# Patient Record
Sex: FEMALE | Race: WHITE | NOT HISPANIC OR LATINO | Employment: OTHER | URBAN - METROPOLITAN AREA
[De-identification: names, ages, dates, MRNs, and addresses within clinical notes are randomized per-mention and may not be internally consistent; named-entity substitution may affect disease eponyms.]

---

## 2021-07-02 ENCOUNTER — RECORDS - HEALTHEAST (OUTPATIENT)
Dept: LAB | Facility: CLINIC | Age: 31
End: 2021-07-02

## 2021-07-02 LAB
CLUE CELLS: NORMAL
TRICHOMONAS, WET PREP: NORMAL
YEAST, WET PREP: NORMAL

## 2021-07-04 LAB — BACTERIA SPEC CULT: ABNORMAL

## 2022-01-05 ENCOUNTER — LAB REQUISITION (OUTPATIENT)
Dept: LAB | Facility: CLINIC | Age: 32
End: 2022-01-05
Payer: COMMERCIAL

## 2022-01-05 DIAGNOSIS — Z34.03 ENCOUNTER FOR SUPERVISION OF NORMAL FIRST PREGNANCY, THIRD TRIMESTER: ICD-10-CM

## 2022-01-05 DIAGNOSIS — Z3A.36 36 WEEKS GESTATION OF PREGNANCY: ICD-10-CM

## 2022-01-05 DIAGNOSIS — O14.90 UNSPECIFIED PRE-ECLAMPSIA, UNSPECIFIED TRIMESTER: ICD-10-CM

## 2022-01-05 LAB
ALBUMIN SERPL-MCNC: 2.9 G/DL (ref 3.5–5)
ALP SERPL-CCNC: 108 U/L (ref 45–120)
ALT SERPL W P-5'-P-CCNC: 19 U/L (ref 0–45)
AST SERPL W P-5'-P-CCNC: 22 U/L (ref 0–40)
BILIRUB DIRECT SERPL-MCNC: <0.1 MG/DL
BILIRUB SERPL-MCNC: 0.2 MG/DL (ref 0–1)
BUN SERPL-MCNC: 9 MG/DL (ref 8–22)
CREAT SERPL-MCNC: 0.64 MG/DL (ref 0.6–1.1)
ERYTHROCYTE [DISTWIDTH] IN BLOOD BY AUTOMATED COUNT: 12.8 % (ref 10–15)
GFR SERPL CREATININE-BSD FRML MDRD: >90 ML/MIN/1.73M2
GROUP B STREPTOCOCCUS (EXTERNAL): NEGATIVE
HCT VFR BLD AUTO: 38.8 % (ref 35–47)
HEPATITIS B SURFACE ANTIGEN (EXTERNAL): NONREACTIVE
HEPATITIS C ANTIBODY (EXTERNAL): NONREACTIVE
HGB BLD-MCNC: 13.1 G/DL (ref 11.7–15.7)
HIV1+2 AB SERPL QL IA: NONREACTIVE
LDH SERPL L TO P-CCNC: 334 U/L (ref 125–220)
MCH RBC QN AUTO: 31.7 PG (ref 26.5–33)
MCHC RBC AUTO-ENTMCNC: 33.8 G/DL (ref 31.5–36.5)
MCV RBC AUTO: 94 FL (ref 78–100)
PLATELET # BLD AUTO: 301 10E3/UL (ref 150–450)
PROT SERPL-MCNC: 6.9 G/DL (ref 6–8)
RBC # BLD AUTO: 4.13 10E6/UL (ref 3.8–5.2)
RUBELLA ANTIBODY IGG (EXTERNAL): NORMAL
URATE SERPL-MCNC: 4.9 MG/DL (ref 2–7.5)
WBC # BLD AUTO: 10.8 10E3/UL (ref 4–11)

## 2022-01-05 PROCEDURE — 80076 HEPATIC FUNCTION PANEL: CPT | Mod: ORL | Performed by: MIDWIFE

## 2022-01-05 PROCEDURE — 83615 LACTATE (LD) (LDH) ENZYME: CPT | Mod: ORL | Performed by: MIDWIFE

## 2022-01-05 PROCEDURE — 82565 ASSAY OF CREATININE: CPT | Mod: ORL | Performed by: MIDWIFE

## 2022-01-05 PROCEDURE — 84520 ASSAY OF UREA NITROGEN: CPT | Mod: ORL | Performed by: MIDWIFE

## 2022-01-05 PROCEDURE — 84550 ASSAY OF BLOOD/URIC ACID: CPT | Mod: ORL | Performed by: MIDWIFE

## 2022-01-05 PROCEDURE — 85027 COMPLETE CBC AUTOMATED: CPT | Mod: ORL | Performed by: MIDWIFE

## 2022-01-09 ENCOUNTER — HOSPITAL ENCOUNTER (INPATIENT)
Facility: CLINIC | Age: 32
LOS: 3 days | Discharge: HOME | End: 2022-01-12
Attending: ADVANCED PRACTICE MIDWIFE | Admitting: ADVANCED PRACTICE MIDWIFE
Payer: COMMERCIAL

## 2022-01-09 PROBLEM — O13.3 GESTATIONAL HYPERTENSION, THIRD TRIMESTER: Status: ACTIVE | Noted: 2022-01-09

## 2022-01-09 PROBLEM — Z34.90 ENCOUNTER FOR INDUCTION OF LABOR: Status: ACTIVE | Noted: 2022-01-09

## 2022-01-09 PROBLEM — M19.90 ARTHRITIS: Status: ACTIVE | Noted: 2017-05-24

## 2022-01-09 PROBLEM — E03.9 ACQUIRED HYPOTHYROIDISM: Status: ACTIVE | Noted: 2022-01-09

## 2022-01-09 PROBLEM — A69.20 LYME DISEASE: Status: ACTIVE | Noted: 2017-12-11

## 2022-01-09 PROBLEM — M79.7 FIBROMYALGIA: Status: ACTIVE | Noted: 2022-01-09

## 2022-01-09 LAB
ALBUMIN SERPL-MCNC: 2.6 G/DL (ref 3.4–5)
ALP SERPL-CCNC: 100 U/L (ref 40–150)
ALT SERPL W P-5'-P-CCNC: 24 U/L (ref 0–50)
ANION GAP SERPL CALCULATED.3IONS-SCNC: 8 MMOL/L (ref 3–14)
AST SERPL W P-5'-P-CCNC: 20 U/L (ref 0–45)
BILIRUB SERPL-MCNC: 0.3 MG/DL (ref 0.2–1.3)
BUN SERPL-MCNC: 10 MG/DL (ref 7–30)
CALCIUM SERPL-MCNC: 9.2 MG/DL (ref 8.5–10.1)
CHLORIDE BLD-SCNC: 109 MMOL/L (ref 94–109)
CO2 SERPL-SCNC: 19 MMOL/L (ref 20–32)
CREAT SERPL-MCNC: 0.56 MG/DL (ref 0.52–1.04)
CREAT UR-MCNC: 49 MG/DL
ERYTHROCYTE [DISTWIDTH] IN BLOOD BY AUTOMATED COUNT: 12.8 % (ref 10–15)
GFR SERPL CREATININE-BSD FRML MDRD: >90 ML/MIN/1.73M2
GLUCOSE BLD-MCNC: 79 MG/DL (ref 70–99)
HCT VFR BLD AUTO: 39.2 % (ref 35–47)
HGB BLD-MCNC: 13 G/DL (ref 11.7–15.7)
MCH RBC QN AUTO: 31.2 PG (ref 26.5–33)
MCHC RBC AUTO-ENTMCNC: 33.2 G/DL (ref 31.5–36.5)
MCV RBC AUTO: 94 FL (ref 78–100)
PLATELET # BLD AUTO: 263 10E3/UL (ref 150–450)
POTASSIUM BLD-SCNC: 3.7 MMOL/L (ref 3.4–5.3)
PROT SERPL-MCNC: 6.8 G/DL (ref 6.8–8.8)
PROT UR-MCNC: 0.07 G/L
PROT/CREAT 24H UR: 0.14 G/G CR (ref 0–0.2)
RBC # BLD AUTO: 4.17 10E6/UL (ref 3.8–5.2)
SODIUM SERPL-SCNC: 136 MMOL/L (ref 133–144)
T4 FREE SERPL-MCNC: 0.77 NG/DL (ref 0.76–1.46)
TSH SERPL DL<=0.005 MIU/L-ACNC: 4.25 MU/L (ref 0.4–4)
URATE SERPL-MCNC: 4.2 MG/DL (ref 2.6–6)
WBC # BLD AUTO: 12.6 10E3/UL (ref 4–11)

## 2022-01-09 PROCEDURE — 84550 ASSAY OF BLOOD/URIC ACID: CPT | Performed by: ADVANCED PRACTICE MIDWIFE

## 2022-01-09 PROCEDURE — 59025 FETAL NON-STRESS TEST: CPT

## 2022-01-09 PROCEDURE — 84443 ASSAY THYROID STIM HORMONE: CPT | Performed by: ADVANCED PRACTICE MIDWIFE

## 2022-01-09 PROCEDURE — 36415 COLL VENOUS BLD VENIPUNCTURE: CPT | Performed by: ADVANCED PRACTICE MIDWIFE

## 2022-01-09 PROCEDURE — G0463 HOSPITAL OUTPT CLINIC VISIT: HCPCS | Mod: 25

## 2022-01-09 PROCEDURE — 85027 COMPLETE CBC AUTOMATED: CPT | Performed by: ADVANCED PRACTICE MIDWIFE

## 2022-01-09 PROCEDURE — 120N000002 HC R&B MED SURG/OB UMMC

## 2022-01-09 PROCEDURE — 84439 ASSAY OF FREE THYROXINE: CPT | Performed by: ADVANCED PRACTICE MIDWIFE

## 2022-01-09 PROCEDURE — 80053 COMPREHEN METABOLIC PANEL: CPT | Performed by: ADVANCED PRACTICE MIDWIFE

## 2022-01-09 PROCEDURE — 84156 ASSAY OF PROTEIN URINE: CPT | Performed by: ADVANCED PRACTICE MIDWIFE

## 2022-01-09 RX ORDER — CARBOPROST TROMETHAMINE 250 UG/ML
250 INJECTION, SOLUTION INTRAMUSCULAR
Status: DISCONTINUED | OUTPATIENT
Start: 2022-01-09 | End: 2022-01-10

## 2022-01-09 RX ORDER — KETOROLAC TROMETHAMINE 30 MG/ML
30 INJECTION, SOLUTION INTRAMUSCULAR; INTRAVENOUS
Status: DISCONTINUED | OUTPATIENT
Start: 2022-01-09 | End: 2022-01-10

## 2022-01-09 RX ORDER — MISOPROSTOL 200 UG/1
800 TABLET ORAL
Status: DISCONTINUED | OUTPATIENT
Start: 2022-01-09 | End: 2022-01-10

## 2022-01-09 RX ORDER — MISOPROSTOL 200 UG/1
400 TABLET ORAL
Status: DISCONTINUED | OUTPATIENT
Start: 2022-01-09 | End: 2022-01-10

## 2022-01-09 RX ORDER — OXYTOCIN/0.9 % SODIUM CHLORIDE 30/500 ML
100-340 PLASTIC BAG, INJECTION (ML) INTRAVENOUS CONTINUOUS PRN
Status: DISCONTINUED | OUTPATIENT
Start: 2022-01-09 | End: 2022-01-10

## 2022-01-09 RX ORDER — HYDROXYZINE HYDROCHLORIDE 50 MG/1
100 TABLET, FILM COATED ORAL
Status: DISCONTINUED | OUTPATIENT
Start: 2022-01-09 | End: 2022-01-10

## 2022-01-09 RX ORDER — METOCLOPRAMIDE HYDROCHLORIDE 5 MG/ML
10 INJECTION INTRAMUSCULAR; INTRAVENOUS EVERY 6 HOURS PRN
Status: DISCONTINUED | OUTPATIENT
Start: 2022-01-09 | End: 2022-01-10

## 2022-01-09 RX ORDER — NALOXONE HYDROCHLORIDE 0.4 MG/ML
0.4 INJECTION, SOLUTION INTRAMUSCULAR; INTRAVENOUS; SUBCUTANEOUS
Status: DISCONTINUED | OUTPATIENT
Start: 2022-01-09 | End: 2022-01-10

## 2022-01-09 RX ORDER — MORPHINE SULFATE 10 MG/ML
10 INJECTION, SOLUTION INTRAMUSCULAR; INTRAVENOUS
Status: COMPLETED | OUTPATIENT
Start: 2022-01-09 | End: 2022-01-10

## 2022-01-09 RX ORDER — PROCHLORPERAZINE 25 MG
25 SUPPOSITORY, RECTAL RECTAL EVERY 12 HOURS PRN
Status: DISCONTINUED | OUTPATIENT
Start: 2022-01-09 | End: 2022-01-09 | Stop reason: HOSPADM

## 2022-01-09 RX ORDER — OXYTOCIN/0.9 % SODIUM CHLORIDE 30/500 ML
340 PLASTIC BAG, INJECTION (ML) INTRAVENOUS CONTINUOUS PRN
Status: DISCONTINUED | OUTPATIENT
Start: 2022-01-09 | End: 2022-01-10

## 2022-01-09 RX ORDER — ONDANSETRON 4 MG/1
4 TABLET, ORALLY DISINTEGRATING ORAL EVERY 6 HOURS PRN
Status: DISCONTINUED | OUTPATIENT
Start: 2022-01-09 | End: 2022-01-09 | Stop reason: HOSPADM

## 2022-01-09 RX ORDER — METOCLOPRAMIDE 10 MG/1
10 TABLET ORAL EVERY 6 HOURS PRN
Status: DISCONTINUED | OUTPATIENT
Start: 2022-01-09 | End: 2022-01-09 | Stop reason: HOSPADM

## 2022-01-09 RX ORDER — OXYTOCIN 10 [USP'U]/ML
10 INJECTION, SOLUTION INTRAMUSCULAR; INTRAVENOUS
Status: DISCONTINUED | OUTPATIENT
Start: 2022-01-09 | End: 2022-01-10

## 2022-01-09 RX ORDER — PROCHLORPERAZINE 25 MG
25 SUPPOSITORY, RECTAL RECTAL EVERY 12 HOURS PRN
Status: DISCONTINUED | OUTPATIENT
Start: 2022-01-09 | End: 2022-01-10

## 2022-01-09 RX ORDER — ONDANSETRON 4 MG/1
4 TABLET, ORALLY DISINTEGRATING ORAL EVERY 6 HOURS PRN
Status: DISCONTINUED | OUTPATIENT
Start: 2022-01-09 | End: 2022-01-10

## 2022-01-09 RX ORDER — NALOXONE HYDROCHLORIDE 0.4 MG/ML
0.2 INJECTION, SOLUTION INTRAMUSCULAR; INTRAVENOUS; SUBCUTANEOUS
Status: DISCONTINUED | OUTPATIENT
Start: 2022-01-09 | End: 2022-01-10

## 2022-01-09 RX ORDER — IBUPROFEN 600 MG/1
600 TABLET, FILM COATED ORAL
Status: DISCONTINUED | OUTPATIENT
Start: 2022-01-09 | End: 2022-01-10

## 2022-01-09 RX ORDER — ONDANSETRON 2 MG/ML
4 INJECTION INTRAMUSCULAR; INTRAVENOUS EVERY 6 HOURS PRN
Status: DISCONTINUED | OUTPATIENT
Start: 2022-01-09 | End: 2022-01-09 | Stop reason: HOSPADM

## 2022-01-09 RX ORDER — METHYLERGONOVINE MALEATE 0.2 MG/ML
200 INJECTION INTRAVENOUS
Status: DISCONTINUED | OUTPATIENT
Start: 2022-01-09 | End: 2022-01-10

## 2022-01-09 RX ORDER — FENTANYL CITRATE 50 UG/ML
50-100 INJECTION, SOLUTION INTRAMUSCULAR; INTRAVENOUS
Status: DISCONTINUED | OUTPATIENT
Start: 2022-01-09 | End: 2022-01-10

## 2022-01-09 RX ORDER — PROCHLORPERAZINE MALEATE 10 MG
10 TABLET ORAL EVERY 6 HOURS PRN
Status: DISCONTINUED | OUTPATIENT
Start: 2022-01-09 | End: 2022-01-10

## 2022-01-09 RX ORDER — ACETAMINOPHEN 325 MG/1
650 TABLET ORAL EVERY 4 HOURS PRN
Status: DISCONTINUED | OUTPATIENT
Start: 2022-01-09 | End: 2022-01-10

## 2022-01-09 RX ORDER — MISOPROSTOL 100 UG/1
25 TABLET ORAL EVERY 4 HOURS PRN
Status: DISCONTINUED | OUTPATIENT
Start: 2022-01-09 | End: 2022-01-10

## 2022-01-09 RX ORDER — TRANEXAMIC ACID 10 MG/ML
1 INJECTION, SOLUTION INTRAVENOUS EVERY 30 MIN PRN
Status: DISCONTINUED | OUTPATIENT
Start: 2022-01-09 | End: 2022-01-10

## 2022-01-09 RX ORDER — METOCLOPRAMIDE 10 MG/1
10 TABLET ORAL EVERY 6 HOURS PRN
Status: DISCONTINUED | OUTPATIENT
Start: 2022-01-09 | End: 2022-01-10

## 2022-01-09 RX ORDER — PROCHLORPERAZINE MALEATE 10 MG
10 TABLET ORAL EVERY 6 HOURS PRN
Status: DISCONTINUED | OUTPATIENT
Start: 2022-01-09 | End: 2022-01-09 | Stop reason: HOSPADM

## 2022-01-09 RX ORDER — ONDANSETRON 2 MG/ML
4 INJECTION INTRAMUSCULAR; INTRAVENOUS EVERY 6 HOURS PRN
Status: DISCONTINUED | OUTPATIENT
Start: 2022-01-09 | End: 2022-01-10

## 2022-01-09 RX ORDER — METOCLOPRAMIDE HYDROCHLORIDE 5 MG/ML
10 INJECTION INTRAMUSCULAR; INTRAVENOUS EVERY 6 HOURS PRN
Status: DISCONTINUED | OUTPATIENT
Start: 2022-01-09 | End: 2022-01-09 | Stop reason: HOSPADM

## 2022-01-10 PROBLEM — O09.899 RUBELLA NON-IMMUNE STATUS, ANTEPARTUM: Status: ACTIVE | Noted: 2022-01-10

## 2022-01-10 PROBLEM — Z28.39 RUBELLA NON-IMMUNE STATUS, ANTEPARTUM: Status: ACTIVE | Noted: 2022-01-10

## 2022-01-10 LAB
ABO/RH(D): NORMAL
ANTIBODY SCREEN: NEGATIVE
HOLD SPECIMEN: NORMAL
RPR SER QL: NONREACTIVE
SARS-COV-2 RNA RESP QL NAA+PROBE: NEGATIVE
SPECIMEN EXPIRATION DATE: NORMAL
T PALLIDUM AB SER QL: REACTIVE

## 2022-01-10 PROCEDURE — 250N000009 HC RX 250: Performed by: REGISTERED NURSE

## 2022-01-10 PROCEDURE — U0005 INFEC AGEN DETEC AMPLI PROBE: HCPCS | Performed by: ADVANCED PRACTICE MIDWIFE

## 2022-01-10 PROCEDURE — 59409 OBSTETRICAL CARE: CPT | Performed by: ADVANCED PRACTICE MIDWIFE

## 2022-01-10 PROCEDURE — 10907ZC DRAINAGE OF AMNIOTIC FLUID, THERAPEUTIC FROM PRODUCTS OF CONCEPTION, VIA NATURAL OR ARTIFICIAL OPENING: ICD-10-PCS | Performed by: REGISTERED NURSE

## 2022-01-10 PROCEDURE — 86592 SYPHILIS TEST NON-TREP QUAL: CPT | Performed by: ADVANCED PRACTICE MIDWIFE

## 2022-01-10 PROCEDURE — 258N000003 HC RX IP 258 OP 636: Performed by: REGISTERED NURSE

## 2022-01-10 PROCEDURE — 36415 COLL VENOUS BLD VENIPUNCTURE: CPT | Performed by: ADVANCED PRACTICE MIDWIFE

## 2022-01-10 PROCEDURE — 3E0P7VZ INTRODUCTION OF HORMONE INTO FEMALE REPRODUCTIVE, VIA NATURAL OR ARTIFICIAL OPENING: ICD-10-PCS | Performed by: REGISTERED NURSE

## 2022-01-10 PROCEDURE — 722N000001 HC LABOR CARE VAGINAL DELIVERY SINGLE

## 2022-01-10 PROCEDURE — 250N000013 HC RX MED GY IP 250 OP 250 PS 637: Performed by: ADVANCED PRACTICE MIDWIFE

## 2022-01-10 PROCEDURE — 86850 RBC ANTIBODY SCREEN: CPT | Performed by: ADVANCED PRACTICE MIDWIFE

## 2022-01-10 PROCEDURE — 250N000009 HC RX 250

## 2022-01-10 PROCEDURE — 250N000011 HC RX IP 250 OP 636: Performed by: ADVANCED PRACTICE MIDWIFE

## 2022-01-10 PROCEDURE — 0KQM0ZZ REPAIR PERINEUM MUSCLE, OPEN APPROACH: ICD-10-PCS | Performed by: ADVANCED PRACTICE MIDWIFE

## 2022-01-10 PROCEDURE — 120N000002 HC R&B MED SURG/OB UMMC

## 2022-01-10 PROCEDURE — 86780 TREPONEMA PALLIDUM: CPT | Performed by: ADVANCED PRACTICE MIDWIFE

## 2022-01-10 PROCEDURE — 86901 BLOOD TYPING SEROLOGIC RH(D): CPT | Performed by: ADVANCED PRACTICE MIDWIFE

## 2022-01-10 RX ORDER — OXYTOCIN 10 [USP'U]/ML
INJECTION, SOLUTION INTRAMUSCULAR; INTRAVENOUS
Status: DISCONTINUED
Start: 2022-01-10 | End: 2022-01-10 | Stop reason: HOSPADM

## 2022-01-10 RX ORDER — MISOPROSTOL 200 UG/1
TABLET ORAL
Status: DISCONTINUED
Start: 2022-01-10 | End: 2022-01-10 | Stop reason: HOSPADM

## 2022-01-10 RX ORDER — DOCUSATE SODIUM 100 MG/1
100 CAPSULE, LIQUID FILLED ORAL DAILY
Qty: 20 CAPSULE | Refills: 0 | Status: SHIPPED | OUTPATIENT
Start: 2022-01-11 | End: 2022-01-11

## 2022-01-10 RX ORDER — HYDROCORTISONE 2.5 %
CREAM (GRAM) TOPICAL 3 TIMES DAILY PRN
Status: DISCONTINUED | OUTPATIENT
Start: 2022-01-10 | End: 2022-01-12 | Stop reason: HOSPADM

## 2022-01-10 RX ORDER — MISOPROSTOL 200 UG/1
800 TABLET ORAL
Status: DISCONTINUED | OUTPATIENT
Start: 2022-01-10 | End: 2022-01-12 | Stop reason: HOSPADM

## 2022-01-10 RX ORDER — LIDOCAINE HYDROCHLORIDE 10 MG/ML
INJECTION, SOLUTION EPIDURAL; INFILTRATION; INTRACAUDAL; PERINEURAL
Status: COMPLETED
Start: 2022-01-10 | End: 2022-01-10

## 2022-01-10 RX ORDER — METHYLERGONOVINE MALEATE 0.2 MG/ML
200 INJECTION INTRAVENOUS
Status: DISCONTINUED | OUTPATIENT
Start: 2022-01-10 | End: 2022-01-12 | Stop reason: HOSPADM

## 2022-01-10 RX ORDER — IBUPROFEN 800 MG/1
800 TABLET, FILM COATED ORAL EVERY 6 HOURS PRN
Status: DISCONTINUED | OUTPATIENT
Start: 2022-01-10 | End: 2022-01-12 | Stop reason: HOSPADM

## 2022-01-10 RX ORDER — BISACODYL 10 MG
10 SUPPOSITORY, RECTAL RECTAL DAILY PRN
Status: DISCONTINUED | OUTPATIENT
Start: 2022-01-10 | End: 2022-01-12 | Stop reason: HOSPADM

## 2022-01-10 RX ORDER — ACETAMINOPHEN 325 MG/1
650 TABLET ORAL EVERY 4 HOURS PRN
Status: DISCONTINUED | OUTPATIENT
Start: 2022-01-10 | End: 2022-01-12 | Stop reason: HOSPADM

## 2022-01-10 RX ORDER — MISOPROSTOL 200 UG/1
400 TABLET ORAL
Status: DISCONTINUED | OUTPATIENT
Start: 2022-01-10 | End: 2022-01-12 | Stop reason: HOSPADM

## 2022-01-10 RX ORDER — MODIFIED LANOLIN
OINTMENT (GRAM) TOPICAL
Status: DISCONTINUED | OUTPATIENT
Start: 2022-01-10 | End: 2022-01-12 | Stop reason: HOSPADM

## 2022-01-10 RX ORDER — SODIUM CHLORIDE, SODIUM LACTATE, POTASSIUM CHLORIDE, CALCIUM CHLORIDE 600; 310; 30; 20 MG/100ML; MG/100ML; MG/100ML; MG/100ML
INJECTION, SOLUTION INTRAVENOUS CONTINUOUS PRN
Status: DISCONTINUED | OUTPATIENT
Start: 2022-01-10 | End: 2022-01-10

## 2022-01-10 RX ORDER — CARBOPROST TROMETHAMINE 250 UG/ML
250 INJECTION, SOLUTION INTRAMUSCULAR
Status: DISCONTINUED | OUTPATIENT
Start: 2022-01-10 | End: 2022-01-12 | Stop reason: HOSPADM

## 2022-01-10 RX ORDER — OXYTOCIN/0.9 % SODIUM CHLORIDE 30/500 ML
340 PLASTIC BAG, INJECTION (ML) INTRAVENOUS CONTINUOUS PRN
Status: DISCONTINUED | OUTPATIENT
Start: 2022-01-10 | End: 2022-01-12 | Stop reason: HOSPADM

## 2022-01-10 RX ORDER — OXYTOCIN/0.9 % SODIUM CHLORIDE 30/500 ML
1-24 PLASTIC BAG, INJECTION (ML) INTRAVENOUS CONTINUOUS
Status: DISCONTINUED | OUTPATIENT
Start: 2022-01-10 | End: 2022-01-10

## 2022-01-10 RX ORDER — LIDOCAINE 40 MG/G
CREAM TOPICAL
Status: DISCONTINUED | OUTPATIENT
Start: 2022-01-10 | End: 2022-01-10

## 2022-01-10 RX ORDER — OXYTOCIN/0.9 % SODIUM CHLORIDE 30/500 ML
PLASTIC BAG, INJECTION (ML) INTRAVENOUS
Status: DISCONTINUED
Start: 2022-01-10 | End: 2022-01-10 | Stop reason: HOSPADM

## 2022-01-10 RX ORDER — OXYTOCIN 10 [USP'U]/ML
10 INJECTION, SOLUTION INTRAMUSCULAR; INTRAVENOUS
Status: DISCONTINUED | OUTPATIENT
Start: 2022-01-10 | End: 2022-01-12 | Stop reason: HOSPADM

## 2022-01-10 RX ORDER — TRANEXAMIC ACID 10 MG/ML
1 INJECTION, SOLUTION INTRAVENOUS EVERY 30 MIN PRN
Status: DISCONTINUED | OUTPATIENT
Start: 2022-01-10 | End: 2022-01-12 | Stop reason: HOSPADM

## 2022-01-10 RX ORDER — DOCUSATE SODIUM 100 MG/1
100 CAPSULE, LIQUID FILLED ORAL DAILY
Status: DISCONTINUED | OUTPATIENT
Start: 2022-01-11 | End: 2022-01-12 | Stop reason: HOSPADM

## 2022-01-10 RX ADMIN — FENTANYL CITRATE 100 MCG: 50 INJECTION INTRAMUSCULAR; INTRAVENOUS at 19:17

## 2022-01-10 RX ADMIN — MORPHINE SULFATE 10 MG: 10 INJECTION, SOLUTION INTRAMUSCULAR; INTRAVENOUS at 02:26

## 2022-01-10 RX ADMIN — ACETAMINOPHEN 650 MG: 325 TABLET, FILM COATED ORAL at 15:49

## 2022-01-10 RX ADMIN — IBUPROFEN 800 MG: 800 TABLET, FILM COATED ORAL at 22:00

## 2022-01-10 RX ADMIN — SODIUM CHLORIDE, POTASSIUM CHLORIDE, SODIUM LACTATE AND CALCIUM CHLORIDE: 600; 310; 30; 20 INJECTION, SOLUTION INTRAVENOUS at 14:15

## 2022-01-10 RX ADMIN — Medication 2 MILLI-UNITS/MIN: at 09:06

## 2022-01-10 RX ADMIN — SODIUM CHLORIDE, POTASSIUM CHLORIDE, SODIUM LACTATE AND CALCIUM CHLORIDE: 600; 310; 30; 20 INJECTION, SOLUTION INTRAVENOUS at 09:05

## 2022-01-10 RX ADMIN — MISOPROSTOL 25 MCG: 100 TABLET ORAL at 00:10

## 2022-01-10 RX ADMIN — LIDOCAINE HYDROCHLORIDE 10 ML: 10 INJECTION, SOLUTION EPIDURAL; INFILTRATION; INTRACAUDAL; PERINEURAL at 19:17

## 2022-01-10 RX ADMIN — HYDROXYZINE HYDROCHLORIDE 100 MG: 50 TABLET, FILM COATED ORAL at 01:25

## 2022-01-10 RX ADMIN — MISOPROSTOL 25 MCG: 100 TABLET ORAL at 04:23

## 2022-01-10 NOTE — PROGRESS NOTES
Blood pressure 136/81, pulse 94, temperature 98.1  F (36.7  C), temperature source Oral, resp. rate 16, last menstrual period 04/25/2021, not currently breastfeeding.  Patient Vitals for the past 24 hrs:   BP Temp Temp src Pulse Resp   01/10/22 0730 136/81 98.1  F (36.7  C) Oral -- 16   01/10/22 0631 125/73 97.9  F (36.6  C) Oral -- 14   01/10/22 0423 123/76 98  F (36.7  C) Oral -- 14   01/10/22 0100 (!) 133/90 98.2  F (36.8  C) Oral -- 14   01/10/22 0030 -- 97.8  F (36.6  C) Oral -- 20   01/09/22 2335 (!) 144/98 -- -- -- --   01/09/22 2316 (!) 175/105 -- -- -- --   01/09/22 2302 (!) 152/97 -- -- -- --   01/09/22 2130 (!) 142/92 -- -- -- --   01/09/22 2111 (!) 157/98 97.9  F (36.6  C) Oral 94 17     General appearance: javier Abarca was a KARY late last night from [facility] for new diagnosis of GHTN. HEELP labs WNL on admission. Had one severe range BP since arrival and BP has been WNL overnight. Reports having a mild HA this morning but denies vision changes, and RUQ/epigastric pain.     Flores balloon placed at midnight and x2 doses of vaginal cytotec given overnight (last at 0423). Their flores bulb fell out while up to the bathroom this morning at 0715. They are feeling groggy from morphine and vistaril given overnight. Agreeable to cervical exam this morning. States they prefer to avoid epidural during labor but are open to it if needed. Aware of option for IV fentanyl and N2O2 for pain control.     Partner Kai and gogo Jang at bedside for support.     CONTACTIONS: irreg  Pitocin- none,  Antibiotics- none  FETAL HEART TONES: continuous EFM- baseline 135 with moderate variability, having intermittent periods of minimal variability, and positive accelerations. No decelerations.  ROM: not ruptured  PELVIC EXAM:4/ 70%/ Posterior/ average/ -2 , KELLY=6    THYROID: no nodules or goiter palpated     # Pain Assessment:  Current Pain Score 1/10/2022   Patient currently in pain? yes   -  Rima is experiencing pain due to contractions. Pain management was discussed with Tsering and her partner Kai and gogo Jang and the plan was created in a collaborative fashion.  Tsering's response to the current recommendations: engaged  - Non-pharmacologic adjuvants: aromtherapy, movement, hydrotherapy  - Open to IV fentanyl, N2O2 and epidural however would like to avoid these if possible - wants to be flexible during birth.       Results for orders placed or performed during the hospital encounter of 01/09/22   CBC with platelets     Status: Abnormal   Result Value Ref Range    WBC Count 12.6 (H) 4.0 - 11.0 10e3/uL    RBC Count 4.17 3.80 - 5.20 10e6/uL    Hemoglobin 13.0 11.7 - 15.7 g/dL    Hematocrit 39.2 35.0 - 47.0 %    MCV 94 78 - 100 fL    MCH 31.2 26.5 - 33.0 pg    MCHC 33.2 31.5 - 36.5 g/dL    RDW 12.8 10.0 - 15.0 %    Platelet Count 263 150 - 450 10e3/uL   Comprehensive metabolic panel     Status: Abnormal   Result Value Ref Range    Sodium 136 133 - 144 mmol/L    Potassium 3.7 3.4 - 5.3 mmol/L    Chloride 109 94 - 109 mmol/L    Carbon Dioxide (CO2) 19 (L) 20 - 32 mmol/L    Anion Gap 8 3 - 14 mmol/L    Urea Nitrogen 10 7 - 30 mg/dL    Creatinine 0.56 0.52 - 1.04 mg/dL    Calcium 9.2 8.5 - 10.1 mg/dL    Glucose 79 70 - 99 mg/dL    Alkaline Phosphatase 100 40 - 150 U/L    AST 20 0 - 45 U/L    ALT 24 0 - 50 U/L    Protein Total 6.8 6.8 - 8.8 g/dL    Albumin 2.6 (L) 3.4 - 5.0 g/dL    Bilirubin Total 0.3 0.2 - 1.3 mg/dL    GFR Estimate >90 >60 mL/min/1.73m2   Uric acid     Status: Normal   Result Value Ref Range    Uric Acid 4.2 2.6 - 6.0 mg/dL   Protein  random urine with Creat Ratio     Status: None   Result Value Ref Range    Total Protein Random Urine g/L 0.07 g/L    Total Protein Urine g/gr Creatinine 0.14 0.00 - 0.20 g/g Cr    Creatinine Urine mg/dL 49 mg/dL   TSH with free T4 reflex     Status: Abnormal   Result Value Ref Range    TSH 4.25 (H) 0.40 - 4.00 mU/L   T4 free     Status: Normal    Result Value Ref Range    Free T4 0.77 0.76 - 1.46 ng/dL   Asymptomatic COVID-19 Virus (Coronavirus) by PCR Nasopharyngeal     Status: Normal    Specimen: Nasopharyngeal; Swab   Result Value Ref Range    SARS CoV2 PCR Negative Negative    Narrative    Testing was performed using the travis  SARS-CoV-2 & Influenza A/B Assay on the travis  Freya  System.  This test should be ordered for the detection of SARS-COV-2 in individuals who meet SARS-CoV-2 clinical and/or epidemiological criteria. Test performance is unknown in asymptomatic patients.  This test is for in vitro diagnostic use under the FDA EUA for laboratories certified under CLIA to perform moderate and/or high complexity testing. This test has not been FDA cleared or approved.  A negative test does not rule out the presence of PCR inhibitors in the specimen or target RNA in concentration below the limit of detection for the assay. The possibility of a false negative should be considered if the patient's recent exposure or clinical presentation suggests COVID-19.  [facility] are certified under the Clinical Laboratory Improvement Amendments of 1988 (CLIA-88) as qualified to perform moderate and/or high complexity laboratory testing.   Extra Purple Top Tube     Status: None   Result Value Ref Range    Hold Specimen [facility]    Adult Type and Screen     Status: None   Result Value Ref Range    ABO/RH(D) A POS     Antibody Screen Negative Negative    SPECIMEN EXPIRATION DATE [id number]    Hepatitis B Surface Antigen (External Result)     Status: None   Result Value Ref Range    Hepatitis B Surface Antigen (External) Nonreactive Nonreactive   Rubella Antibody IgG (External Result)     Status: None   Result Value Ref Range    Rubella Antibody IgG (External) Non-Immune Nonreactive   HIV-1 Antibody (External Result)     Status: None   Result Value Ref Range    HIV 1&2 Antibody (External) Nonreactive Nonreactive   Group B Streptococcus (External  Result)     Status: None   Result Value Ref Range    Group B Streptococcus (External) Negative Negative   Hepatitis C Antibody (External Result)     Status: None   Result Value Ref Range    Hepatitis C Antibody (External) Nonreactive Nonreactive   ABO/Rh type and screen     Status: None    Narrative    The following orders were created for panel order ABO/Rh type and screen.  Procedure                               Abnormality         Status                     ---------                               -----------         ------                     Adult Type and Screen[[id number]]                            Final result                 Please view results for these tests on the individual orders.   Extra Tube     Status: None    Narrative    The following orders were created for panel order Extra Tube.  Procedure                               Abnormality         Status                     ---------                               -----------         ------                     Extra Purple Top Tube[[id number]]                            Final result                 Please view results for these tests on the individual orders.       ASSESSMENT:  ==============  IUP @ 37w1d for induction of labor.  Indication: gestational hypertension   COVID Negative  Fetal Heart Rate Tracing category one  GBS - negative  Hx hypothyroidism  Fibromyalgia  MMR Non-immune  KARY from [facility]  Hx lyme's disease    Patient Active Problem List   Diagnosis     Gestational hypertension, third trimester     Lyme disease     Arthritis     Fibromyalgia     Acquired hypothyroidism     Encounter for induction of labor     Rubella non-immune status, antepartum     PLAN:  ===========  - Pt is planning to eat breakfast then will begin pitocin. Discussed method of induction at length with patient and Kai. All questions answered, pt is agreeable to plan.   - Desires to move during labor, discussed need for continuous fetal monitoring  "and will plan on wireless monitors to aid with movement . Ambulation, hydration, position changes, birthing ball/sling and tub options to facilitate labor.  - Pain medication options of Nitrous Oxide, Fentanyl IV and epidural reviewed with pt. Pt is interested in all options but would like to avoid if possible.   - Has hx of hypothyroidism, was taking \"amour thyroid\" pre-pregnancy then discontinued at start of pregnancy. Labs during pregnancy drawn at [facility] 6/30/21 TSH - 1.68, on admission TSH 4.25 - plan to follow-up postpartum with endocrine. Undecided if [facility] will follow or if they prefer to follow-up with us.     CHOLO Duarte CNM  "

## 2022-01-10 NOTE — PROGRESS NOTES
Blood pressure 114/81, pulse 94, temperature 98.4  F (36.9  C), temperature source Oral, resp. rate 16, last menstrual period 04/25/2021, not currently breastfeeding.  Patient Vitals for the past 24 hrs:   BP Temp Temp src Pulse Resp   01/10/22 1430 114/81 98.4  F (36.9  C) Oral -- 16   01/10/22 1130 (!) 145/82 98.5  F (36.9  C) Oral -- 16   01/10/22 0906 129/76 98  F (36.7  C) Oral -- 16   01/10/22 0730 136/81 98.1  F (36.7  C) Oral -- 16   01/10/22 0631 125/73 97.9  F (36.6  C) Oral -- 14   01/10/22 0423 123/76 98  F (36.7  C) Oral -- 14   01/10/22 0100 (!) 133/90 98.2  F (36.8  C) Oral -- 14   01/10/22 0030 -- 97.8  F (36.6  C) Oral -- 20 01/09/22 2335 (!) 144/98 -- -- -- --   01/09/22 2316 (!) 175/105 -- -- -- --   01/09/22 2302 (!) 152/97 -- -- -- --   01/09/22 2130 (!) 142/92 -- -- -- --   01/09/22 2111 (!) 157/98 97.9  F (36.6  C) Oral 94 17     General appearance: uncomfortable with contractions    YogeshTsering is feeling contractions are stronger but still only feeling them irregularly, some are back to back and others are spaced out  7-10 minutes. Is up and moving around the room with . Reports feeling tired and has a HA, would like tylenol. Denies RUQ pain or any visual changes. Continues to be difficult to trace contraction pattern with toco monitor. Pt is agreeable to cervical exam and open to having AROM.     CONTACTIONS: irreg and difficult to farshad, RN adjusting toco frequently. Pt reports feeling ctx Q1-7 minutes  Pitocin- 12 mu/min.,  Antibiotics- none  FETAL HEART TONES: continuous EFM- baseline 135 with moderate variability and positive accelerations. No decelerations.  ROM: not ruptured  PELVIC EXAM:4/ 60%/ Posterior/ average/ -2 . Unchanged from prior exam    ASSESSMENT:  ==============  IUP @ 37w1d for induction of labor.  Indication: gestational hypertension   Hx hypothyroidism  Fibromyalgia  MMR Non-immune  KARY from [facility]  Hx lyme's disease  Fetal Heart Rate  Tracing category one  GBS- negative    Patient Active Problem List   Diagnosis     Gestational hypertension, third trimester     Lyme disease     Arthritis     Fibromyalgia     Acquired hypothyroidism     Encounter for induction of labor     Rubella non-immune status, antepartum     PLAN:  ===========  Discussion with pt and Kai about AROM. Reviewed risks and benefits of procedure including risk of fetal intolerance and cord prolapse. Reviewed cord prolapse is unlikely when AROM is done in controlled setting.   Pt was agreeable to AROM however prior to performing they became very tearful and exam was paused. Pt would like to wait for AROM and see if contractions will become stronger on their own in the next hour then is open to revisiting this as option.   Give tylenol for HA, encourage position changes out of bed.   Close monitoring of BP, titrate pitocin per protocol.   Will reassess in 1 hour or sooner prn.     CHOLO Duarte CNM

## 2022-01-10 NOTE — PROVIDER NOTIFICATION
"   01/10/22 0715   Provider Notification   Provider Name/Title Henrietta PAULINO   Method of Notification Electronic Page   Request Evaluate - Remote   Notification Reason Other (Comment)  (FB out)   Paged ARISTIDESM, \"FYI: FB came out when patient sat on toliet.\"  "

## 2022-01-10 NOTE — PROGRESS NOTES
Labor Progress Note:    Date:  1/10/2022  Time:  6:17 AM    Patient Name:  Tsering Montejo  :      1990  MRN:      [id number]    Assessment:    30 yo , at 37w, IOL for gHTN  Now normotensive, HELLP labs NL  Not in labor  Fetal Heart Rate Tracing category one  GBS- negative  Negative SARS-CoV-2 PCR test  Rubella Non-Immune  Hypothyroidism, not on meds        Plan:   -Continue with PV Miso until adequate cervical change for pitocin  -Continue with Malik traction until it falls out.   -Routine CNM support & management. Encourage position changes, ambulation, rest as desired.  -Ambulation, hydration, position changes, birthing ball/sling and tub options to facilitate labor.   -Anticipate progress and NSVB.   -Plan MMR postpartum  -Reevaluate progress in 2-3 hours or sooner with a change in status.    Subjective:  Rima is sleeping comfortably. Received Vistaril at 0125 and Morphine at 0226, Kai is sleeping at their side.   Voiding without issue.     Objective:  /76   Pulse 94   Temp 98  F (36.7  C) (Oral)   Resp 14   LMP 2021 (Approximate)   Breastfeeding No       General appearance: comfortable  CONTRACTIONS: irreg and mild  Pitocin- none,  Antibiotics- none  FETAL HEART TONES: continuous EFM- baseline 130 with moderate variability and positive accelerations. No decelerations.  ROM: not ruptured  PELVIC EXAM:deferred    # Pain Assessment:  Current Pain Score 2021   Patient currently in pain? denies   Pain location -   Pain descriptors -   Rima's pain level was assessed and she currently denies pain.        Provider:    CHOLO Hassan, CNM

## 2022-01-10 NOTE — PROVIDER NOTIFICATION
"   01/10/22 0417   Provider Notification   Provider Name/Title Tessie PAULINO   Method of Notification Electronic Page   Request Evaluate - Remote   Notification Reason Status Update   Paged, \"pt feeling mild ctx, toco shows occ ctx + irritability. Morphine IM given for pain at 0226. Would you like another cytotec?\"  "

## 2022-01-10 NOTE — PROGRESS NOTES
Tessie PAULINO returned paged regarding next dose of cytotec 25mcg PV. LORA stated to administer next dose.

## 2022-01-10 NOTE — PLAN OF CARE
Data: Patient arrived to labor and delivery unit with complaints of headache today and mild upper gastric pain. States her blood pressure this morning when she woke up was 123/87, this afternoon 134/85 and this evening 152/98. Tessie Worthy CNM notified of patient arrival for pre-eclampsia evaluation    OB History    Para Term  AB Living   1 0 0 0 0 0   SAB IAB Ectopic Multiple Live Births   0 0 0 0 0      # Outcome Date GA Lbr Delfino/2nd Weight Sex Delivery Anes PTL Lv   1 Current            .  Medical History:   Past Medical History:   Diagnosis Date     Fibromyalgia      Lyme disease    .  Gestational age 37w0d. Vital signs per doc flowsheet. Fetal movement present. Denies experiencing any contractions, bleeding, or leakage of fluid.  . Support persons Kai present.  Action: Verbal consent for EFM, external fetal monitors applied.Patient and support persons educated on labor process. Patient instructed to report change in fetal movement, contractions, vaginal leaking of fluid or bleeding, abdominal pain, or any concerns related to the pregnancy to her nurse/physician. Patient oriented to room, call light in reach.

## 2022-01-10 NOTE — PROGRESS NOTES
Data: Patient presented to [facility] at 2100.   Reason for maternal/fetal assessment per patient is Rule Out Pre-eclampsia  .  Patient is a . Prenatal record reviewed.      OB History    Para Term  AB Living   1 0 0 0 0 0   SAB IAB Ectopic Multiple Live Births   0 0 0 0 0      # Outcome Date GA Lbr Delfino/2nd Weight Sex Delivery Anes PTL Lv   1 Current            . Medical history:   Past Medical History:   Diagnosis Date     Fibromyalgia      Lyme disease    . Gestational Age 37w1d. VSS. Fetal movement present. Patient denies cramping, backache, vaginal discharge, pelvic pressure, UTI symptoms, GI problems, bloody show, vaginal bleeding, edema, headache, visual disturbances, epigastric or URQ pain, abdominal pain, rupture of membranes. Support persons Kai present.  Action: Verbal consent for EFM. Triage assessment completed. EFM applied. Uterine assessment in flow record. Fetal assessment: Presumed adequate fetal oxygenation documented (see flow record).   Response: Tessie Ledezma CNM informed of arrival, assessment, and pt's concerns. Plan per provider is admission for iol. Patient verbalized agreement with plan. Patient transferred to room 444 ambulatory, oriented to room and call light.

## 2022-01-10 NOTE — PROGRESS NOTES
Blood pressure (!) 145/87, pulse 94, temperature 99.1  F (37.3  C), temperature source Oral, resp. rate 18, last menstrual period 04/25/2021, not currently breastfeeding.  Patient Vitals for the past 24 hrs:   BP Temp Temp src Pulse Resp   01/10/22 1540 (!) 145/87 99.1  F (37.3  C) Oral -- 18   01/10/22 1430 114/81 98.4  F (36.9  C) Oral -- 16   01/10/22 1130 (!) 145/82 98.5  F (36.9  C) Oral -- 16   01/10/22 0906 129/76 98  F (36.7  C) Oral -- 16   01/10/22 0730 136/81 98.1  F (36.7  C) Oral -- 16   01/10/22 0631 125/73 97.9  F (36.6  C) Oral -- 14   01/10/22 0423 123/76 98  F (36.7  C) Oral -- 14   01/10/22 0100 (!) 133/90 98.2  F (36.8  C) Oral -- 14   01/10/22 0030 -- 97.8  F (36.6  C) Oral -- 20   01/09/22 2335 (!) 144/98 -- -- -- --   01/09/22 2316 (!) 175/105 -- -- -- --   01/09/22 2302 (!) 152/97 -- -- -- --   01/09/22 2130 (!) 142/92 -- -- -- --   01/09/22 2111 (!) 157/98 97.9  F (36.6  C) Oral 94 17     General appearance: uncomfortable with contractions    Rima has become much more uncomfortable with contractions, has been up in the tub and eating small snacks. Is hopeful that things are moving along and that their body is kicking into labor, open to cervical exam and discussing AROM again. They are feeling very emotional as labor becomes stronger and tearful, receiving excellent support from gogo and Kai. Reports PAINTER has improved since taking tylenol, continues to deny vision changes or RUQ pain.     CONTACTIONS: every 3-4 minutes and moderate  Pitocin- 12 mu/min.,  Antibiotics- none  FETAL HEART TONES: continuous EFM- baseline 135 with moderate variability and positive accelerations. No decelerations.  ROM: clear fluid  PELVIC EXAM:4.5/ 70%/ Mid/ average/ -2     ASSESSMENT:  ==============  IUP @ 37w1d for induction of labor.  Indication: gestational hypertension   Hx hypothyroidism  Fibromyalgia  MMR Non-immune  KARY from [facility]  Hx lyme's disease  Fetal Heart Rate  Tracing category one  GBS- negative    Patient Active Problem List   Diagnosis     Gestational hypertension, third trimester     Lyme disease     Arthritis     Fibromyalgia     Acquired hypothyroidism     Encounter for induction of labor     Rubella non-immune status, antepartum     PLAN:  ===========  Rima was agreeable to AROM, membranes ruptured at 1748 for a moderate amt of clear fluid. They are using a meditation playlist and position changes, birthing ball and tub options to facilitate labor.  Plan to continue labor induction with Pitocin.   RN to replace toco monitor to aid in tracing contractions (Henrietta are also timing contractions).   Anticipate     CHOLO Duarte CNM

## 2022-01-10 NOTE — H&P
"PLANNED OUT OF HOSPITAL BIRTH TRANSFER ADMIT NOTE  =============================================  37w0d    Tsering Montejo is a 31 year old non-binary, female at birth, with an Patient's last menstrual period was 2021 (approximate). and Estimated Date of Delivery: 2022 is admitted to the [facility] on 2022 at 10:47 PM with elevated blood pressures and a mild headache.     HPI  ================    Patient has been seen by [facility] for prenatal care and was planning an out of hospital birth.   Transferring midwife name(s),/birth center & phone number: [facility], Ximena Brady, [phone]  Midwife here supporting patient: None, partner Kai is here  Records received, reviewed and scanned into chart.     Rima presented to the [facility] clinic last Wednesday with a headache that was mild. Their blood pressure during that visit was 146/92. Their HELLP labs were normal and they were sent home with a blood pressure cuff. At home their blood pressures have been 140-150/80-95.   Rima has a mild headache today that resolved with Tylenol use. They also reports mild epigastric pain that has since resolved.     Denies fever, cough, SOB or chest pain. Denies having contact with anyone who is Covid-19 positive. Pt has had Covid-19 Vaccinations as well as the COVID19 booster.  Agreeable to Covid-19 testing  Contractions- none  Fetal movement- active  ROM- no   Vaginal bleeding- none  GBS- negative  FOB- is involved, Kai, non-binary, they/them pronouns    Weight gain- 170-148 lbs, Total weight gain- 22 lbs  Height- 5'2\"  BMI- 27 at intake  First prenatal visit at 13/2 weeks, Total visits- 9    PROBLEM LIST  =================  Patient Active Problem List    Diagnosis Date Noted     Rubella non-immune status, antepartum 01/10/2022     Priority: Medium     Needs MMR postpartum       Gestational hypertension, third trimester 2022     Priority: Medium     1/10/22 NL " HELLP labs       Fibromyalgia 2022     Priority: Medium     Acquired hypothyroidism 2022     Priority: Medium     Has not taken any medication for this since 2021       Encounter for induction of labor 2022     Priority: Medium       Patient with planned Out of Hospital Birth is transferring by car to the hospital for blood pressure assessment  Patient has been seen by [facility] for prenatal care.  Transferring midwife name(s),/birth center & phone number: Ximena Brady, [phone]  Midwife here supporting patient: None, partner aKi is here  Records received, reviewed and scanned into chart.        Lyme disease 2017     Priority: Medium     Was on disulfram treatment for Lyme's disease 2019-10/2020       Arthritis 2017     Priority: Medium       HISTORIES  ============  No Known Allergies  Past Medical History:   Diagnosis Date     Fibromyalgia      Lyme disease      Past Surgical History:   Procedure Laterality Date     WISDOM TOOTH EXTRACTION     .  History reviewed. No pertinent family history.  Social History     Tobacco Use     Smoking status: Never Smoker     Smokeless tobacco: Never Used   Substance Use Topics     Alcohol use: Not Currently     OB History    Para Term  AB Living   1 0 0 0 0 0   SAB IAB Ectopic Multiple Live Births   0 0 0 0 0      # Outcome Date GA Lbr Delfino/2nd Weight Sex Delivery Anes PTL Lv   1 Current                 LABS:   ===========  Prenatal Labs reviewed per transfer records:   Rhogam not indicated  ABO/ RH: A positive, antibody negative  Rubella immune Non-immune  HBsAg: negative   HIV: negative   RPR: NR   GCT: date 11/10/21, 75 gram 2 hour fasting 86, 1 hour 79, 2 hour 101.  GBS: date 22, result Negative  OTHER: HgbA1C: 5.0 (21),   Received Tdap 21  HELLP panel from 22:   Uric acid: 4.9  AST: 22  ALT 19  Hgb: 13.1  PTL: 301       Lab Results   Component Value Date    HGB 13.0 2022     Rubella  Non-immune  Lab Results   Component Value Date    GBS Negative 01/05/2022     Other labs:  COVID-19 PCR Results    COVID-19 PCR Results 6/8/20 7/30/20 11/24/20 12/3/20 2/4/21   COVID-19 Virus PCR to [place] - Result Undetected       COVID-19 Virus PCR Source NASOPHARYNGEAL SWAB       COVID-19 Virus by PCR (External Result)  Not Detected NOT DETECTED NOT DETECTED NOT DETECTED      Comments are available for some flowsheets but are not being displayed.         COVID-19 Antibody Results, Testing for Immunity    COVID-19 Antibody Results, Testing for Immunity   No data to display.           Results for orders placed or performed during the hospital encounter of 01/09/22 (from the past 24 hour(s))   Protein  random urine with Creat Ratio   Result Value Ref Range    Total Protein Random Urine g/L 0.07 g/L    Total Protein Urine g/gr Creatinine 0.14 0.00 - 0.20 g/g Cr    Creatinine Urine mg/dL 49 mg/dL   CBC with platelets   Result Value Ref Range    WBC Count 12.6 (H) 4.0 - 11.0 10e3/uL    RBC Count 4.17 3.80 - 5.20 10e6/uL    Hemoglobin 13.0 11.7 - 15.7 g/dL    Hematocrit 39.2 35.0 - 47.0 %    MCV 94 78 - 100 fL    MCH 31.2 26.5 - 33.0 pg    MCHC 33.2 31.5 - 36.5 g/dL    RDW 12.8 10.0 - 15.0 %    Platelet Count 263 150 - 450 10e3/uL   Comprehensive metabolic panel   Result Value Ref Range    Sodium 136 133 - 144 mmol/L    Potassium 3.7 3.4 - 5.3 mmol/L    Chloride 109 94 - 109 mmol/L    Carbon Dioxide (CO2) 19 (L) 20 - 32 mmol/L    Anion Gap 8 3 - 14 mmol/L    Urea Nitrogen 10 7 - 30 mg/dL    Creatinine 0.56 0.52 - 1.04 mg/dL    Calcium 9.2 8.5 - 10.1 mg/dL    Glucose 79 70 - 99 mg/dL    Alkaline Phosphatase 100 40 - 150 U/L    AST 20 0 - 45 U/L    ALT 24 0 - 50 U/L    Protein Total 6.8 6.8 - 8.8 g/dL    Albumin 2.6 (L) 3.4 - 5.0 g/dL    Bilirubin Total 0.3 0.2 - 1.3 mg/dL    GFR Estimate >90 >60 mL/min/1.73m2   TSH with free T4 reflex   Result Value Ref Range    TSH 4.25 (H) 0.40 - 4.00 mU/L   T4 free   Result Value Ref  Range    Free T4 0.77 0.76 - 1.46 ng/dL   Uric acid   Result Value Ref Range    Uric Acid 4.2 2.6 - 6.0 mg/dL       ULTRASOUND  =============  Date 9/9/21, result SLIUP, posterior placenta, no pevia, normal anatomy, , 19/2 weeks, EDB 1/31/22    ROS  =========  Pt denies significant respiratory, cardiovacular, GI, or muscular/skeletalcomplaints.    See RN data base ROS.   Mild epigastric pain  Mild HA on and off today    PHYSICAL EXAM:  ===============  BP (!) 144/98   Pulse 94   Temp 97.9  F (36.6  C) (Oral)   Resp 17   LMP 04/25/2021 (Approximate)   Patient Vitals for the past 24 hrs:   BP Temp Temp src Pulse Resp   01/09/22 2335 (!) 144/98 -- -- -- --   01/09/22 2316 (!) 175/105 -- -- -- --   01/09/22 2302 (!) 152/97 -- -- -- --   01/09/22 2130 (!) 142/92 -- -- -- --   01/09/22 2111 (!) 157/98 97.9  F (36.6  C) Oral 94 17     General appearance: comfortable  GENERAL APPEARANCE: healthy, alert and no distress  RESP: lungs clear to auscultation - no rales, rhonchi or wheezes  CV: regular rates and rhythm, normal S1 S2, no S3 or S4 and no murmur,and no varicosities  ABDOMEN:  soft, nontender, no epigastric pain  SKIN: no suspicious lesions or rashes  NEURO: Headache has resolved, Denies blurred vision, other vision changes  PSYCH: mentation appears normal. and affect normal/bright  Legs: Reflexes normal bilaterally    Abdomen: gravid, vertex fetus per Leopold's, non-tender between contractions.   Cephalic presentation confirmed by BSUS STEFANIE  EFW-  6.5 lbs.   CONTACTIONS: none  FETAL HEART TONES: continuous EFM- baseline 130 with moderate variability and positive accelerations. No decelerations.  PELVIC EXAM: 1/ 50%/ Posterior/ average/ -1   KELLY SCORE: 5  0000 Malik Catheter placed and inflated to 75mL  0010 25mcg misoprostol placed PV  BLOODY SHOW: no   ROM:no  FLUID: clear  ROMPLUS: not done    # Pain Assessment:  Current Pain Score 1/9/2022   Patient currently in pain? adore sheppard pain level was  assessed and she currently denies pain.        ASSESSMENT:  ==============  IUP @ 37w0d admitted for a medical IOL  Transferred from [facility]  Gestational Hypertension, HELLP labs normal  NST REACTIVE  Fetal Heart Rate - category one  GBS- negative  SARS-CoV-2 PCR test pending  Rubella non-immune  Hypothyroidism, not on medications       PLAN:  ===========  Admit - see IP orders  -Reviewed diagnosis of gestational hypertension versus pre-eclampsia and that 10-50% of people diagnosed with gHTN will go on to develop pre-eclampsia. Reviewed the criteria for pre-eclampsia with severe features, including blood pressure >160/110.   Discussed that if they have sustained elevated blood pressures that we recommend antihypertensive medications as well as transferring to MD team for care and administration of Magnesium Sulfate.   -Carefully reviewed the recommendation that they be induced now given their elevated blood pressure, with the hope that they might be able to birth before their blood pressure becomes more severe/she develops pre-eclampsia with SF.   -After much discussion Rima and Kai accept an IOL. -Cervical ripening with Cervical Malik Bulb and vaginal misoprostol, risks and benefits reviewed with pt. Agreeable to plan.  -Pain medication options of nitrous oxide, fentanyl IV and epidural anesthesia reviewed with pt. Pt is interested in non-pharmacologic methods to help her cope with labor including birth ball, hydrotherapy, aromatherapy    MD consultant on call Dr. Castillo/ available prn  Therapeutic sleep with Vistaril and Morphine reviewed with pt.  Agreeable to plan  Tessie Hinson, LORA, APRN

## 2022-01-10 NOTE — PLAN OF CARE
Rima is feeling some cramping but overall feels well  She was able to sleep a little last night and hoping to nap some this morning. She is afebrile. VSS. Baby with periods of minimal and moderate variability and accelerations. Pitocin started at 0905 and is currently at 4 chester-units. Contractions are irregular and with irritability. Will continue to monitor and increase pitocin as appropriate.

## 2022-01-10 NOTE — PROGRESS NOTES
Blood pressure (!) 145/82, pulse 94, temperature 98.5  F (36.9  C), temperature source Oral, resp. rate 16, last menstrual period 04/25/2021, not currently breastfeeding.  Patient Vitals for the past 24 hrs:   BP Temp Temp src Pulse Resp   01/10/22 1130 (!) 145/82 98.5  F (36.9  C) Oral -- 16   01/10/22 0906 129/76 98  F (36.7  C) Oral -- 16   01/10/22 0730 136/81 98.1  F (36.7  C) Oral -- 16   01/10/22 0631 125/73 97.9  F (36.6  C) Oral -- 14   01/10/22 0423 123/76 98  F (36.7  C) Oral -- 14   01/10/22 0100 (!) 133/90 98.2  F (36.8  C) Oral -- 14   01/10/22 0030 -- 97.8  F (36.6  C) Oral -- 20 01/09/22 2335 (!) 144/98 -- -- -- --   01/09/22 2316 (!) 175/105 -- -- -- --   01/09/22 2302 (!) 152/97 -- -- -- --   01/09/22 2130 (!) 142/92 -- -- -- --   01/09/22 2111 (!) 157/98 97.9  F (36.6  C) Oral 94 17     General appearance: javier Abarca is feeling comfortable in bed, reports feeling mild contractions but is coping well with heating pads and position changes in bed. Kai and gogo Laine remain with them for support. Had some spotting when up to the bathroom. Contraction pattern is still irregular and they are able to rest. Denies questions or concerns about induction at this time.     CONTACTIONS: irreg, mild and uterine irritability  Pitocin- 8 mu/min.,  Antibiotics- none  FETAL HEART TONES: continuous EFM- baseline 140 with moderate variability and positive accelerations. No decelerations.  ROM: not ruptured  PELVIC EXAM:deferred    ASSESSMENT:  ==============  IUP @ 37w1d for induction of labor.  Indication: gestational hypertension   Hx hypothyroidism  Fibromyalgia  MMR Non-immune  [facility] from [facility]  Hx lyme's disease  Fetal Heart Rate Tracing category one  GBS- negative  Patient Active Problem List   Diagnosis     Gestational hypertension, third trimester     Lyme disease     Arthritis     Fibromyalgia     Acquired hypothyroidism     Encounter for induction of labor      Rubella non-immune status, antepartum     PLAN:  ===========  Ambulation, hydration, position changes, birthing ball/sling and tub options to facilitate labor.  Continue labor induction with Pitocin, titrate per protocol.   Discussed with patient adjusting toco monitor to accurately assess contraction pattern. Reviewed that membranes remain intact and could consider AROM later in the day.   Will reevaluate in 2-4 hours/prn  CHOLO Duarte CNM

## 2022-01-10 NOTE — PROVIDER NOTIFICATION
"Reviewed EFM with Bowen Shrestha due to contraction pattern. Concern if the pattern is tachysystole or irritability. Pt is comfortable, has been able to nap, and feelings \"tightenings at times\".  Plan is to start an IV bolus.  "

## 2022-01-10 NOTE — PLAN OF CARE
Labor Shift Note  Data: Contraction irregular/occasional, palpate mild, cramping, and uterine irritability. Fetal assessment appropriately for gestational age.   Vitals:    22 2335 01/10/22 0030 01/10/22 0100 01/10/22 0423   BP: (!) 144/98  (!) 133/90 123/76   Pulse:       Resp:  20 14 14   Temp:  97.8  F (36.6  C) 98.2  F (36.8  C) 98  F (36.7  C)   TempSrc:  Oral Oral Oral   Leaking no fluid.  Signs and symptoms of infection absent.  Blood pressures WNL with 1 severe range noted to provider. Signs and symptoms of pre-eclampsia: present, ankle edema and epigastric pain.  Support person Kai present.  Interventions: Continue uterine/fetal assessment continuous. Vital Signs per order set. Comfort measures as needed. Patient would like minimal interventions.  Medicated for narcotics. See MAR.  Plan: Anticipate . Provide labor/coping assistance as needed by patient and support person.  Observe for and notify care provider of indications of progressing labor, need for pain medications,  or signs of fetal/maternal compromise.

## 2022-01-10 NOTE — PROVIDER NOTIFICATION
01/10/22 0003   Provider Notification   Provider Name/Title Tessie Brisa CNM   Method of Notification At Bedside   Request Evaluate in Person   Notification Reason SVE;Status Update;Patient Arrived   CNM at bedside to discuss POC with pt and significant other. Pt agreeable to plan. Malik balloon placed along with cytotec PV.

## 2022-01-11 LAB
ERYTHROCYTE [DISTWIDTH] IN BLOOD BY AUTOMATED COUNT: 12.9 % (ref 10–15)
HCT VFR BLD AUTO: 34.5 % (ref 35–47)
HGB BLD-MCNC: 11.6 G/DL (ref 11.7–15.7)
MCH RBC QN AUTO: 31.6 PG (ref 26.5–33)
MCHC RBC AUTO-ENTMCNC: 33.6 G/DL (ref 31.5–36.5)
MCV RBC AUTO: 94 FL (ref 78–100)
PLATELET # BLD AUTO: 217 10E3/UL (ref 150–450)
RBC # BLD AUTO: 3.67 10E6/UL (ref 3.8–5.2)
WBC # BLD AUTO: 17.9 10E3/UL (ref 4–11)

## 2022-01-11 PROCEDURE — 250N000013 HC RX MED GY IP 250 OP 250 PS 637: Performed by: ADVANCED PRACTICE MIDWIFE

## 2022-01-11 PROCEDURE — 36415 COLL VENOUS BLD VENIPUNCTURE: CPT | Performed by: ADVANCED PRACTICE MIDWIFE

## 2022-01-11 PROCEDURE — 85027 COMPLETE CBC AUTOMATED: CPT | Performed by: ADVANCED PRACTICE MIDWIFE

## 2022-01-11 PROCEDURE — 120N000002 HC R&B MED SURG/OB UMMC

## 2022-01-11 RX ORDER — ACETAMINOPHEN 325 MG/1
650 TABLET ORAL EVERY 4 HOURS PRN
Refills: 0
Start: 2022-01-11 | End: 2023-08-24

## 2022-01-11 RX ORDER — IBUPROFEN 800 MG/1
800 TABLET, FILM COATED ORAL EVERY 6 HOURS PRN
Start: 2022-01-11

## 2022-01-11 RX ORDER — DOCUSATE SODIUM 100 MG/1
100 CAPSULE, LIQUID FILLED ORAL 2 TIMES DAILY PRN
Qty: 60 CAPSULE | Refills: 1 | Status: SHIPPED | OUTPATIENT
Start: 2022-01-11 | End: 2022-04-05

## 2022-01-11 RX ADMIN — ACETAMINOPHEN 650 MG: 325 TABLET, FILM COATED ORAL at 22:45

## 2022-01-11 RX ADMIN — IBUPROFEN 800 MG: 800 TABLET, FILM COATED ORAL at 10:35

## 2022-01-11 RX ADMIN — DOCUSATE SODIUM 100 MG: 100 CAPSULE, LIQUID FILLED ORAL at 08:48

## 2022-01-11 RX ADMIN — IBUPROFEN 800 MG: 800 TABLET, FILM COATED ORAL at 19:26

## 2022-01-11 RX ADMIN — IBUPROFEN 800 MG: 800 TABLET, FILM COATED ORAL at 04:13

## 2022-01-11 RX ADMIN — ACETAMINOPHEN 650 MG: 325 TABLET, FILM COATED ORAL at 08:48

## 2022-01-11 RX ADMIN — ACETAMINOPHEN 650 MG: 325 TABLET, FILM COATED ORAL at 13:28

## 2022-01-11 NOTE — PROGRESS NOTES
Blood pressure (!) 142/88, pulse 94, temperature 99.1  F (37.3  C), temperature source Oral, resp. rate 18, last menstrual period 2021, not currently breastfeeding.  Patient Vitals for the past 24 hrs:   BP Temp Temp src Pulse Resp   01/10/22 1747 (!) 142/88 -- -- -- 18   01/10/22 1540 (!) 145/87 99.1  F (37.3  C) Oral -- 18   01/10/22 1430 114/81 98.4  F (36.9  C) Oral -- 16   01/10/22 1130 (!) 145/82 98.5  F (36.9  C) Oral -- 16   01/10/22 0906 129/76 98  F (36.7  C) Oral -- 16   01/10/22 0730 136/81 98.1  F (36.7  C) Oral -- 16   01/10/22 0631 125/73 97.9  F (36.6  C) Oral -- 14   01/10/22 0423 123/76 98  F (36.7  C) Oral -- 14   01/10/22 0100 (!) 133/90 98.2  F (36.8  C) Oral -- 14   01/10/22 0030 -- 97.8  F (36.6  C) Oral -- 20   22 2335 (!) 144/98 -- -- -- --   22 2316 (!) 175/105 -- -- -- --   22 2302 (!) 152/97 -- -- -- --   22 2130 (!) 142/92 -- -- -- --   22 2111 (!) 157/98 97.9  F (36.6  C) Oral 94 17     Late entry  General appearance: uncomfortable with contractions  Called to room, pt on toilet and grunting intermittently with contr. FHT tracing intermittently and uncertain if maternal or fetal HR. FHT then noted to be 150 with moderate variability. Not pushing. Stepped out of room and then was called back quickly with baby .  Arrived and pt sidelying in bed with  noted with contr. Not pushing. FHT continuing to trace irreg with uncertain decelerations or maternal. Moderate variability.  CONTACTIONS: every 2-3 minutes  Pitocin- 6 mu/min.  ROM: clear fluid  PELVIC EXAM:  A: imminent delivery  P:See delivery note  CHOLO Lucia CNM

## 2022-01-11 NOTE — PROVIDER NOTIFICATION
01/10/22 1844   Provider Notification   Provider Name/Title [facility]    Method of Notification Electronic Page   Request Evaluate in Person   Notification Reason Labor Status;Status Update   Pt. is grunting during contractions. Could you please come assess.

## 2022-01-11 NOTE — PLAN OF CARE
Rima agreed to AROM. AROM with clear fluid. Afterwards contractions became intense and Rima needed to walk in room, dance with partner, and sit in the bathroom. They felt most comfortable sitting on the toilet due to nausea and vomiting. While sitting on the toilet Rima stated they felt baby was moving down as well as the contractions causing them  to bear down. Baby head visible. Pt brought back to the bed, CNM notified. Rima delivered their baby at 1903.  NICU called to delivery for a shoulder dystocia of 50 seconds. Rima and baby bonding well.

## 2022-01-11 NOTE — PLAN OF CARE
Rima has been resting in bed using the peanut ball, sitting on the birthing ball, and walking in room. Pt feels contractions every 5-7 minutes. [place] picks up irritability despite multiple readjustments. Baby with moderate variability and accelerations. Will continue to support.

## 2022-01-11 NOTE — PLAN OF CARE
Patient arrived to [facility] unit via wheelchair at 2145,with belongings, accompanied by spouse/ significant other, with infant in arms. Received report from Anali DEAN and checked bands. Unit and room orientation completd. Call light given; no concerns present at this time. Continue with plan of care.

## 2022-01-11 NOTE — PROGRESS NOTES
Post Partum Note  SIGNIFICANT PROBLEMS:  Patient Active Problem List    Diagnosis Date Noted     Rubella non-immune status, antepartum 01/10/2022     Priority: Medium     Needs MMR postpartum        (normal spontaneous vaginal delivery) 01/10/2022     Priority: Medium     Gestational hypertension, third trimester 2022     Priority: Medium     1/10/22 NL HELLP labs       Fibromyalgia 2022     Priority: Medium     Acquired hypothyroidism 2022     Priority: Medium     Has not taken any medication for this since 2021       Encounter for induction of labor 2022     Priority: Medium       Patient with planned Out of Hospital Birth is transferring by car to the hospital for blood pressure assessment  Patient has been seen by [facility] for prenatal care.  Transferring midwife name(s),/birth center & phone number: Ximena Brady, [phone]  Midwife here supporting patient: None, partner Kai is here  Records received, reviewed and scanned into chart.        Lyme disease 2017     Priority: Medium     Was on disulfram treatment for Lyme's disease 2019-10/2020       Arthritis 2017     Priority: Medium       INTERVAL HISTORY:  /60 (BP Location: Left arm)   Pulse 71   Temp 97.8  F (36.6  C) (Oral)   Resp 18   LMP 2021 (Approximate)   Breastfeeding Unknown   Pt stable, baby is rooming in.   Breast feeding status:initiated some difficulty with latch   Complications since 2 hours post delivery: None  # Pain Assessment:  Current Pain Score 2022   Patient currently in pain? denies       Patient is tolerating activity well, Voiding without difficulty, cramping is minimal and is relieved by Ibuprophen, lochia is decreasing and patient denies clots.  Perineal pain is is minimal and is relieved by Ibuprophen.  The perineum laceration is well approximated and minimal  edema present    Physical Exam:  General: Bright affect, loving with baby. Family supportive.   Breasts:  soft, nontender, nipples intact, no cracking, redness or bruising.   Abdomen: soft, nontender, fundus below U.   Lochia: small amount, rubra, no clots or odor.   Perineum: well-approximated.   Extremities: no edema, Bakari's negative.     Postpartum hemoglobin   Hemoglobin   Date Value Ref Range Status   2022 13.0 11.7 - 15.7 g/dL Final     Blood type No results found for: ABO  No results found for: RH  Rubella status No results found for: RUQIGG  Rubella: immune   History of depression: no. Postpartum depression warning signs reviewed.    ASSESSMENT/PLAN:  Normal postpartum exam , Stable Post-partum day #1  Complications:GHTN   Plan d/c home tomorrow. Home Visit Ordered- No  RTC will have [facility] visit at 3 days and 7 days   Postpartum warning s/s reviewed, including bleeding/clots, fever, mastitis, or depression  Kegels/ crunches  Continue prenatal vitamins  Birthcontrol planned:Undecided. Fertility and contraception options reviewed.  Current Discharge Medication List      START taking these medications    Details   acetaminophen (TYLENOL) 325 MG tablet Take 2 tablets (650 mg) by mouth every 4 hours as needed for mild pain or fever (greater than or equal to 38  C /100.4  F (oral) or 38.5  C/ 101.4  F (core).)  Refills: 0    Associated Diagnoses:  (normal spontaneous vaginal delivery)      docusate sodium (COLACE) 100 MG capsule Take 1 capsule (100 mg) by mouth daily  Qty: 20 capsule, Refills: 0    Associated Diagnoses:  (normal spontaneous vaginal delivery)      ibuprofen (ADVIL/MOTRIN) 800 MG tablet Take 1 tablet (800 mg) by mouth every 6 hours as needed for other (cramping)    Associated Diagnoses:  (normal spontaneous vaginal delivery)         CONTINUE these medications which have NOT CHANGED    Details   Prenatal Vit-Fe Fumarate-FA (PRENATAL MULTIVITAMIN  PLUS IRON) 27-1 MG TABS Take by mouth daily Thorn brand         STOP taking these medications       doxylamine (UNISOM) 25 MG TABS tablet  Comments:   Reason for Stopping:             CHOLO SharmaM

## 2022-01-11 NOTE — PLAN OF CARE
Data: Tsering Montejo transferred to 7124 via wheelchair at 2141. Baby transferred via parent's arms.  Action: Receiving unit notified of transfer: Yes. Patient and family notified of room change. Report given to Ewelina DEAN at 2143. Belongings sent to receiving unit. Accompanied by Registered Nurse. Oriented patient to surroundings. Call light within reach. ID bands double-checked with receiving RN.  Response: Patient tolerated transfer and is stable.

## 2022-01-11 NOTE — PROVIDER NOTIFICATION
01/10/22 225   Provider Notification   Provider Name/Title Henrietta   Method of Notification Electronic Page   Request Evaluate-Remote   Notification Reason Status Update  (FYI: /92 ; recheck 139/82)   FYI: /92 ; recheck 139/82

## 2022-01-11 NOTE — PLAN OF CARE
VSS, BPs mildly elevated. Postpartum checks WDL. Up independently, voiding without difficulty. Pain managed with tylenol and ibuprofen. Breastfeeding with assist for deeper latch.

## 2022-01-11 NOTE — L&D DELIVERY NOTE
Delivery Summary  DELIVERY NOTE:  Brief Labor Course: KARY from [facility] for IOL for gestational hypertension. HELLP labs drawn and wnl. GBS neg. Hx of hypthyroidism but not taking medication. Flores placed with two doses of vaginal misoprostol for cervical ripening. Given morphine and vistaril for therapeutic rest.  Pitocin started when flores bulb out. Minimal cervical change throughout the day, and pt consented to AROM at 1748p with clear fluid when SVE 4-5/70/-1. Contractions became more intense and pt was standing at bedside and sitting on toilet during contractions.  Called to room at 1845p, pt on toilet and grunting intermittently with contr. FHT tracing intermittently and uncertain if maternal or fetal HR. FHT then noted to be 150 with moderate variability. Not pushing. Stepped out of room and then was called back quickly with baby .  Arrived and pt sidelying LL in bed with  noted with contr. Not pushing. FHT continuing to trace intermittently with uncertain decelerations or maternal HR. Moderate variability.  Delivery Note:   Head delivered with one push, OA position. Gentle traction applied without delivery of body. Pt then assisted to back and legs to Kiara, baby delivered easily with next push with maternal coaching. Straight OA. No nuchal cord. Terminal mec noted. Infant placed on moms abdomen and noted to have poor tone with weak cry with stimulation. NICU then called for delivery, cord clamped and cut and infant brought to warmer. Lusty cry noted with improvement in tone. See NICU resuscitation note. Cord gases sent.  IV with pitocin opened after delivery of baby. spont jordan placenta delivered intact. Second degree laceration repaired with 3-0 vicryl over 1% lidocaine with IV Fentanyl for pain relief. Small shallow bilateral periurethral tears noted, hemostatic and not repaired. Fundus firm to massage without free flow or clots. EBL 100cc.  NICU noted papules on baby, pt  states she had febrile illness early in pregnancy with negative Covid-19 at that time. No orders per NICU team. Baby returned to mother and placed skin to skin. Mom and baby stable. Placenta to pathology.    IUP at 37 weeks gestation delivered on January 10, 2022.     delivery of a viable Female infant.  Weight : 6-3  Apgars of 7 at 1 minute and 8 at 5 minutes.  Labor was induced.  Medications administered  in labor:  Pain Rx morphine/ vistaril, Fentanyl; Antibiotics No; Other   Perineum: 2nd degree- repaired, and bilateral shallow Periurethral lacerations, not repaired  Placenta-mechanism: spontaneous, intact,  with a 3 vessel cord. Placenta was sent to Pathology. IV oxytocin was given After delivery of baby  EBL 100cc.  Complications of labor and delivery: Gestational Hypertension, Meconium stained fluid and Precipitous delivery. Papules on infant at delivery  Anticipated Discharge Date: 22  Birth attendants: CHOLO Garcia CNM, CNM MRN# [id number]   Age: 31 year old YOB: 1990     ASSESSMENT & PLAN:        Shivani Montejo-Tsering [[id number]]    Labor Event Times    Labor onset date: 1/10/22 Onset time:  5:48 PM   Dilation complete date: 1/10/22 Complete time:  6:58 PM   Start pushing date/time: 1/10/2022 1858      Labor Length    1st Stage (hrs): 1 (min): 10   2nd Stage (hrs): 0 (min): 5   3rd Stage (hrs): 0 (min): 5      Labor Events     labor?: No   steroids: None  Labor Type: Induction/Cervical ripening  Predominate monitoring during 1st stage: continuous electronic fetal monitoring     Antibiotics received during labor?: No     Rupture date/time: 1/10/22 1748   Rupture type: Artificial Rupture of Membranes  Fluid color: Clear     Induction: Misoprostol, Mechanical ripening agent, Oxytocin  Induction date/time: 22    Cervical ripening date/time: 22 0010   Indications for induction: Hypertension      Augmentation: AROM  Indications for augmentation: Ineffective Contraction Pattern  1:1 continuous labor support provided by?: RN       Delivery/Placenta Date and Time    Delivery Date: 1/10/22 Delivery Time:  7:03 PM   Placenta Date/Time: 1/10/2022  7:08 PM  Oxytocin given at the time of delivery: after delivery of baby  Delivering clinician: Esthela Shrestha APRN CNM   Other personnel present at delivery:  Provider Role             Vaginal Counts     Initial count performed by 2 team members:  Two Team Members   sophie Shrestha cnm   Calatayud       Needles Suture Needles Sponges (RETIRED) Instruments   Initial counts 2 1 5    Added to count 0      Relief counts       Final counts 2 1 5          Placed during labor Accounted for at the end of labor   FSE NA    IUPC NA    Cervadil NA          Relief count performed by 2 team members:  Two Team Members   MELODY Mandujano RN       Final count performed by 2 team members:  Two Team Members   yury del real RN      Final count correct?: Yes     Apgars    Living status: Living   1 Minute 5 Minute 10 Minute 15 Minute 20 Minute   Skin color: 1  1       Heart rate: 2  2       Reflex irritability: 1  2       Muscle tone: 1  2       Respiratory effort: 2  1       Total: 7  8       Apgars assigned by: 1 MINUTE BY BRADY BOWMAN RN; 5 MIN BY LOWELL EMMANUEL CNP     Cord    Cord Complications: None               Cord Blood Disposition: Lab    Gases Sent?: Yes    Delayed cord clamping?: Yes    Cord Clamping Delay (seconds): 31-60 seconds    Stem cell collection?: No        Resuscitation    Methods: NCPAP, Oximetry  [place] Care at Delivery: Called STAT to delivery. Arrived at 3 minutes of life. Infant on radiant warmer, vigorously crying, good tone. Continued drying and stimulating. Suctioned mouth and nose. Circumoral cyanosis noted. Pulse oximeter placed on right wrist. CPAP +5 21% FiO2 initiated. Oxygen saturations  85-88%. Mild retractions and nasal flarring. CPAP continued for 8 minutes and transitioned to RA with oxygen saturations 95-98%. Non-labored respirations, retractions and nasal flaring resolved. Gross PE is WNL except for pinpoint white papules noted on ankles and flat skin colored macules on forehead, chin, neck, chest, and back.  Infant required no further resuscitation. Infant was shown to mother and father, handoff to nursery nurse and will be transferred to the [facility] for further care.    Vanessa EMMANUEL CNP 1/10/2022 7:32 PM          Labor Events and Shoulder Dystocia    Fetal Tracing Prior to Delivery: Category 2  Shoulder dystocia present?: Neg     Delivery (Maternal) (Provider to Complete) ([id number])    Episiotomy: None  Perineal lacerations: 2nd Repaired?: Yes   Periurethral laceration: bilateral Repaired?: No   Repair suture: 3-0 Vicryl  Number of repair packets: 1  Genital tract inspection done: Pos     Blood Loss  Mother: Tsering Montejo #[id number]   Start of Mother's Information    Delivery Blood Loss  01/10/22 1748 - 01/10/22 2031    None           End of Mother's Information  Mother: Tsering Montejo #[id number]          Delivery - Provider to Complete ([id number])    Delivering clinician: Esthela Shrestha APRN CNM  CNCONNOR Care: Exclusive CNM care in labor  Attempted Delivery Types (Choose all that apply): Spontaneous Vaginal Delivery  Delivery Type (Choose the 1 that will go to the Birth History): Vaginal, Spontaneous                   Other personnel:  Provider Role                    Placenta    Date/Time: 1/10/2022  7:08 PM  Removal: Spontaneous  Disposition: Pathology           Anesthesia    Method: None                Presentation and Position    Presentation: Vertex     Occiput Anterior                 CHOLO Lucia CNM

## 2022-01-12 VITALS
TEMPERATURE: 97.9 F | RESPIRATION RATE: 20 BRPM | DIASTOLIC BLOOD PRESSURE: 90 MMHG | HEART RATE: 87 BPM | SYSTOLIC BLOOD PRESSURE: 128 MMHG

## 2022-01-12 PROCEDURE — 90707 MMR VACCINE SC: CPT | Performed by: ADVANCED PRACTICE MIDWIFE

## 2022-01-12 PROCEDURE — 250N000013 HC RX MED GY IP 250 OP 250 PS 637: Performed by: ADVANCED PRACTICE MIDWIFE

## 2022-01-12 PROCEDURE — 250N000011 HC RX IP 250 OP 636: Performed by: ADVANCED PRACTICE MIDWIFE

## 2022-01-12 PROCEDURE — 90471 IMMUNIZATION ADMIN: CPT | Performed by: ADVANCED PRACTICE MIDWIFE

## 2022-01-12 RX ADMIN — ACETAMINOPHEN 650 MG: 325 TABLET, FILM COATED ORAL at 12:35

## 2022-01-12 RX ADMIN — MEASLES, MUMPS, AND RUBELLA VIRUS VACCINE LIVE 0.5 ML: 1000; 12500; 1000 INJECTION, POWDER, LYOPHILIZED, FOR SUSPENSION SUBCUTANEOUS at 12:40

## 2022-01-12 RX ADMIN — IBUPROFEN 800 MG: 800 TABLET, FILM COATED ORAL at 01:42

## 2022-01-12 RX ADMIN — ACETAMINOPHEN 650 MG: 325 TABLET, FILM COATED ORAL at 02:47

## 2022-01-12 RX ADMIN — ACETAMINOPHEN 650 MG: 325 TABLET, FILM COATED ORAL at 08:09

## 2022-01-12 RX ADMIN — IBUPROFEN 800 MG: 800 TABLET, FILM COATED ORAL at 08:09

## 2022-01-12 RX ADMIN — DOCUSATE SODIUM 100 MG: 100 CAPSULE, LIQUID FILLED ORAL at 08:09

## 2022-01-12 NOTE — PROGRESS NOTES
Data: VSS, postpartum assessments WNL. She is voiding without difficulty, up ad kj, passing gas, eating and drinking normally. Perineum appears to be healing well, lochia WNL, no s/s infection. Breastfeeding infant with latch and position assistance. Taking ibuprofen and tylenol for pain and using jamilah ice packs. Reports good pain management.   Education: Education provided on plan of care___  Response: Pt is agreeable with her plan of care. Positive attachment behaviors observed with infant. Support person, Kai, aspen. Anticipate discharge 1/12/22.

## 2022-01-12 NOTE — LACTATION NOTE
This note was copied from a baby's chart.  Consult for: First time breastfeeding, early term infant    Delivery Information: Infant was born at 37.1 weeks via vaginal delivery on 1/10/22 at 1903.    Maternal Health History: Tsering transferred to [facility] from [facility] due to hypertension. She has a history of hypertension but is generally healthy.    Tsering noted breast growth and sensitivity in early pregnancy. Her breasts are soft and symmetrical with bilateral everted nipples. She has bruised areola and nipple on her left breast. ?    Infant information: Infant is frequently voiding and stooling; surpassing daily output goals. Weight loss at 24 hours of age was -3.6% of birthweight at 5lb 15.2 oz.     Feeding Assessment: Tsering shares she has bruising on her left breast and nipple from one of the first latches. She is predominantly latching on the right breast and hand expressing colostrum on the left to allow bruise to heal.   Tsering was encouraged to wake infant for feedings every 2-3 hours due to early term. She was encouraged to hand express colostrum and feed back to infant with each feeding (pumping if interested) for extra breast stimulation.    Education: potential feeding challenges of early term infants,early feeding cues, benefits of feeding on cue, breastfeeding positions, signs breastfeeding is going well (comfortable latch, age appropriate output and weight loss, swallowing heard at the breast), satiety cues, expected  output,  weight loss, nutritive vs non-nutritive sucking, benefits of skin to skin, the Second Night, benefits of breast massage and hand expression of colostrum, Infant Feeding Log handout, inpatient lactation support and outpatient lactation resources.       Feeding Plan:  Frequent skin to skin, breastfeed on cue at least every 3 hours since early term (8-12 times/day), encourage breast compressions to enhance milk transfer & offer mom's expressed milk  after.    Encourage continued hand expressing after each feeding until milk is in, consider hands on pumping at least 4 times daily to maximize milk supply.     Family will discharge to home today. They will have home postpartum support from [facility] tomorrow. Encouraged to review weight loss with bc staff tomorrow and pump/supplement if concerning due to early term infant.

## 2022-01-12 NOTE — PROVIDER NOTIFICATION
01/11/22 2115   Provider Notification   Notification Reason Other     Pt. requesting discharge this evening. Can you place d/c orders?     Thanks!

## 2022-01-12 NOTE — PROGRESS NOTES
stable throughout shift. VSS. Output adequate for day of age. Breastfeeding with assistance, tolerating feeds well.  screens: CCHD passed, cord clamp removed, PKU, weight loss 3.6%, Bilirubin is high intermediate; redraw at 0130. Positive bonding behaviors observed with family. Continue with plan of care.

## 2022-01-12 NOTE — PLAN OF CARE
Data: Vital signs within normal limits. Postpartum checks within normal limits - see flow record. Patient eating and drinking normally. Patient able to empty bladder independently and is up ambulating. No apparent signs of infection. Episiotomy healing well. Patient performing self cares and is able to care for infant.  Action: Patient medicated during the shift for pain and cramping. See MAR. Patient reassessed within 1 hour after each medication and pain was improved - patient stated she was comfortable. Patient education done about discharge teaching and instructions, DC medications for mother reviewed, bands checked . See flow record.  Response: Positive attachment behaviors observed with infant. Support persons partner present.   Plan: Anticipate discharge today per MD's orders.

## 2022-01-12 NOTE — DISCHARGE INSTRUCTIONS
New Mother Care    Postpartum Appointment:  You should have your postpartum appointment six weeks after delivery.  If you had a  birth, you should have an appointment at two weeks with the physician who performed your surgery, and six weeks with the midwives. Call [phone] to schedule this appointment.     Lactation Appointment with CNM:   If you would like to make a clinic appointment for breastfeeding support with one of the Certified Nurse-Midwives who is also an International Board Certified Lactation Consultant, please call [phone].     Postpartum Changes:  You have experienced many physical and emotional changes during your pregnancy.  After delivery, you will notice other changes.  Here are some tips for common experiences after your baby is born.      Sign/Symptom Cause Suggestions   Mood Swings Hormonal changes, stress, fatigue Rest.  Ask for help.  Contact your health care provider if sadness continues more than two weeks, or caring for baby becomes overwhelming.   Engorged Breasts Swelling with more fluid and breast milk production two to five days after delivery.  May occur whether or not you are breastfeeding. Heat or ice for comfort.  If breastfeeding, nurse frequently.  Use supportive bra without underwire.  Should improve in one to two days.   After pains/ Cramping Cramping of uterus, often with nursing which stimulates the uterus to tighten.  Stronger with subsequent babies (second or more). Use non-aspirin pain reliever (ibuprofen or acetaminophen), especially before breastfeeding.  Empty your bladder frequently (at least every 2 hours).   Increased vaginal bleeding Too much physical activity; breastfeeding (due to uterine contractions). Rest more.  Avoid use of tampons.  Redness and amount of vaginal discharge (bleeding) decreases by three to four weeks after delivery.  Call clinic if you fill more than one pad within two hours.   Perineal discomfort and hemorrhoids Swelling from  "delivery; episiotomy or laceration (tearing); stitches. Ice, non-asprin pain reliever, witch hazel pads, warm tub soaks, stool softener, high fiber diet, kegel exercises. Stitches are absorbed.  Healing in two to three weeks. Do NOT use \"doughnut\" pillow for sitting.   Increased sweating and urinating Body losing extra fluid from pregnancy; hormonal shifts. Empy bladder more often, especially before breastfeeding; increase water intake; improves within three to four days.   Constipation Change in abdominal pressure and swelling after delivery; hormonal changes. Increase fluids and fiber in diet; Metamucil or stool softner as needed.   Skin coloring  Hair Thickness  Swelling Skin darkening and pregnancy rash due to hormonal changes; extra hair growth during pregnancy; increased fluids from pregnancy and birth causes swelling. Darker skin coloring and stretch marks with fade after delivery (no treatment needed).  Extra hair will be lost in two to four months.  Pregnancy swelling resolves in one to four weeks. Continue to hydrate.   Sciatica pain Nerve irritation due to extra weight and pressure during pregnancy. May continue after delivery; heat, acetaminophen, ibuprofen, position changes for comfort.     Postpartum Exercises  These exercises may be started soon after delivery.  If you feel tired or uncomfortable, stop and try these exercises after resting.  Check for any separation in your stomach wall before doing exercises that involve twising or stress on your stomach muscles.  Place your fingers in the center of your upper abdomen and lift your head and shoulders up in a partial sit-up.  If you feel a separation in your muscle wall, it is too soon to do sit ups.  Try the following instead:    Tighten and relax your pelvic floor muscles often.    Breathe deeply while laying on your back.  As you breathe out, lift just your head up and pull the sides of your stomach toward the middle with your hands.  Then breathe " in as you put your head down.  This will help to close the separation of your stomach.    Tilt your pelvis back and forth.  Alternate this with full body stretches.    Move your feet back and forth, then in circular motions.    While lying on your back, slide your heels toward your hips and bend your knees one at a time, then together.    While lying flat on the floor, bend your knees and raise your legs one at a time.    When the stomach wall separation has closed, you can progress to straight curl-ups, diagonal curl-ups, and side leg lifts.    After a  Birth  In addition to the instruction after a vaginal delivery, follow these guidelines:    Check your incision each day for signs of infection: redness, drainage, warmth or discomfort.  Contact your midwife or OB who performed your surgery if you have any of these signs or heavy vaginal bleeding.    Check with your midwife or surgeon before taking tub baths.    You may start driving a car two weeks after delivery.    Gradually increase your physical activity and exercise level according to how comfortable or tired you feel.    During the first days after delivery, try deep breathing, bending and stretching your feet in up-and-down circular motions, extending and tightening your legs while crossed at the ankles, and doing isometric exercises while lying down.    Next, try pelvic lifts with bent knees, bending and straightening your knees separately and together.    After checking for the abdominal rectus (stomach wall) separation, advance to back arching, twisting to each side, and reaching for your knees with pillow support.  Straight curl-ups, diagonal curl-ups and side leg lifts can then be added.    Reminders    Healthy nutrition is still important for your recovery and breastfeeding.  Continue your prenatal vitamins if you are breastfeeding.    It is important for your health and your baby's health to stay smoke-free.  Babies exposed to smoke are sick  more often.    Family planning is important.  Discuss birth control options with your provider.    Warning Signs  Call the on-call midwife if you have:    Heavy bleeding (filling one pad within one to two hours).    Blood clots larger than the size of a golf ball, especially with heavy bleeding.    Vaginal discharge with foul odor or green color.    Fever (oral temperature over 100.3 F).    Signs of bladder infection (burning, frequent urination)    Signs of vaginal infection (vaginal itching, irritation)    Painful, hard lump in breast and/or red streaks with fever.    Vaginal pain or tissue coming out of vagina.    Abdominal pain or abdomen tender to the touch.    Signs of depression or anxiety, affecting sleep or activities of daily living.    If you have questions or concerns and need to speak with a midwife immediately, please call the on-call midwife at [phone].    If you have a non-emergent question or would like to schedule a follow up appointment, please call the clinic midwife at [phone].    Thank you for sharing your birth experience with the [facility].  Congratulations on the birth of your baby!    Postpartum Vaginal Delivery Instructions    Activity       Ask family and friends for help when you need it.    Do not place anything in your vagina for 6 weeks.    You are not restricted on other activities, but take it easy for a few weeks to allow your body to recover from delivery.  You are able to do any activities you feel up to that point.    No driving until you have stopped taking your pain medications (usually two weeks after delivery).     Call your health care provider if you have any of these symptoms:       Increased pain, swelling, redness, or fluid around your stiches from an episiotomy or perineal tear.    A fever above 100.4 F (38 C) with or without chills when placing a thermometer under your tongue.    You soak a sanitary pad with blood within 1 hour, or you  see blood clots larger than a golf ball.    Bleeding that lasts more than 6 weeks.    Vaginal discharge that smells bad.    Severe pain, cramping or tenderness in your lower belly area.    A need to urinate more frequently (use the toilet more often), more urgently (use the toilet very quickly), or it burns when you urinate.    Nausea and vomiting.    Redness, swelling or pain around a vein in your leg.    Problems breastfeeding or a red or painful area on your breast.    Chest pain and cough or are gasping for air.    Problems coping with sadness, anxiety, or depression.  If you have any concerns about hurting yourself or the baby, call your provider immediately.     You have questions or concerns after you return home.     Keep your hands clean:  Always wash your hands before touching your perineal area and stitches.  This helps reduce your risk of infection.  If your hands aren't dirty, you may use an alcohol hand-rub to clean your hands. Keep your nails clean and short.

## 2022-01-12 NOTE — DISCHARGE SUMMARY
Post Partum Note and Discharge Summary      ADMISSION DIAGNOSIS:  Transfer from [facility]  Encounter for an induction of labor for gHTN    DISCHARGE DIAGNOSIS:   1/10/22  Repair of 2nd degree laceratin    HOSPITAL COURSE:  37w1d  Tsering Montejo is a 31 year old female     Pt [facility] 2022  9:00 PM for a medical IOL for gHTN    DELIVERY NOTE:  Brief Labor Course: KARY from [facility] for IOL for gestational hypertension. HELLP labs drawn and wnl. GBS neg. Hx of hypthyroidism but not taking medication. Flores placed with two doses of vaginal misoprostol for cervical ripening. Given morphine and vistaril for therapeutic rest.  Pitocin started when flores bulb out. Minimal cervical change throughout the day, and pt consented to AROM at 1748p with clear fluid when SVE 4-5/70/-1. Contractions became more intense and pt was standing at bedside and sitting on toilet during contractions.  Called to room at 1845p, pt on toilet and grunting intermittently with contr. FHT tracing intermittently and uncertain if maternal or fetal HR. FHT then noted to be 150 with moderate variability. Not pushing. Stepped out of room and then was called back quickly with baby .  Arrived and pt sidelying LL in bed with  noted with contr. Not pushing. FHT continuing to trace intermittently with uncertain decelerations or maternal HR. Moderate variability.  Delivery Note:   Head delivered with one push, OA position. Gentle traction applied without delivery of body. Pt then assisted to back and legs to Kiara, baby delivered easily with next push with maternal coaching. Straight OA. No nuchal cord. Terminal mec noted. Infant placed on moms abdomen and noted to have poor tone with weak cry with stimulation. NICU then called for delivery, cord clamped and cut and infant brought to warmer. Lusty cry noted with improvement in tone. See NICU resuscitation note. Cord gases  sent.  IV with pitocin opened after delivery of baby. spont jordan placenta delivered intact. Second degree laceration repaired with 3-0 vicryl over 1% lidocaine with IV Fentanyl for pain relief. Small shallow bilateral periurethral tears noted, hemostatic and not repaired. Fundus firm to massage without free flow or clots. EBL 100cc.  NICU noted papules on baby, pt states she had febrile illness early in pregnancy with negative Covid-19 at that time. No orders per NICU team. Baby returned to mother and placed skin to skin. Mom and baby stable. Placenta to pathology.     IUP at 37 weeks gestation delivered on January 10, 2022.     delivery of a viable Female infant.  Weight : 6-3  Apgars of 7 at 1 minute and 8 at 5 minutes.  Labor was induced.  Medications administered  in labor:  Pain Rx morphine/ vistaril, Fentanyl; Antibiotics No; Other   Perineum: 2nd degree- repaired, and bilateral shallow Periurethral lacerations, not repaired  Placenta-mechanism: spontaneous, intact,  with a 3 vessel cord. Placenta was sent to Pathology. IV oxytocin was given After delivery of baby  EBL 100cc.  Complications of labor and delivery: Gestational Hypertension, Meconium stained fluid and Precipitous delivery. Papules on infant at delivery  Anticipated Discharge Date: 22  Birth attendants: CHOLO Garcia CNM, CNM      Assessment:   32 yo  Day 2 postpartum,    S/p second degree lac repaired, bilateral periurethral lac unrepaired   IOL for gHTN  Complications:none  Breastfeeding established  history of depression/anxiety  PP Contraceptive Plans: undecided  Rubella Non-Immune  Stable postpartum course.      Plan:    -Discussed jamilah care, breast care, pain management, breastfeeding initiation, bowel changes, return of fertility, postpartum exercises, postpartum mood changes and adjustments, postpartum blues vs. postpartum depression, sleep changes, required support.    -Postpartum warning s/s reviewed, including bleeding/clots, fever, mastitis, and depression  -Plan for today: encouraged rest, skin-to-skin, breastfeeding on cue, adequate pain control, tub soaks, and limiting visitors.   -Rx given for breast pump. Douglas Cagle may choose to get a breast pump through [facility] -Anticipate two day stay with plan for discharge today.   -Plan d/c home today. Home Visit Ordered- Yes.  -Plan to continue with mental health therapy weekly  -MMR postpartum  -Advised follow-up at 1 and 6 weeks postpartum for blood pressure check    Subjective:  Douglas OLIVER Nikia is integrating birth experience; feels ready for discharge. She is up and active in the room independently, is voiding and ambulating without difficulty without dizziness or calf pain. Tolerating normal diet and passing flatus. has had BM. Voiding without issue. Bleeding is light without clots. Pain is well controlled with current medications of ibuprofen and tylenol.   Baby Blanca is feeding on cue. Breastfeeding with the assistance of the nursing staff.   Postpartum contraception plans include undecided.   Support at home identified Kai. They also have support of other friends and family, but are still trying to decide about how to stay safe given the recent surge in Omicron   She reports had depression and anxiety in the past, she currently has a therapist who she plans to see every week   # Discharge Pain Plan: - Patient currently has minimal pain and is being prescribed ibuprofen and tylenol pain medications on discharge.    Complications since 2 hours post delivery: None      SIGNIFICANT PROBLEMS:  Patient Active Problem List    Diagnosis Date Noted     Rubella non-immune status, antepartum 01/10/2022     Priority: Medium     Needs MMR postpartum        (normal spontaneous vaginal delivery) 01/10/2022     Priority: Medium     Gestational hypertension, third trimester 2022     Priority: Medium      1/10/22 NL HELLP labs       Fibromyalgia 2022     Priority: Medium     Acquired hypothyroidism 2022     Priority: Medium     Has not taken any medication for this since 2021       Encounter for induction of labor 2022     Priority: Medium       Patient with planned Out of Hospital Birth is transferring by car to the hospital for blood pressure assessment  Patient has been seen by [facility] for prenatal care.  Transferring midwife name(s),/birth center & phone number: Ximena Brady, [phone]  Midwife here supporting patient: None, partner Kai is here  Records received, reviewed and scanned into chart.        Lyme disease 2017     Priority: Medium     Was on disulfram treatment for Lyme's disease 2019-10/2020       Arthritis 2017     Priority: Medium       Physical Exam:  BP (!) 128/90   Pulse 87   Temp 97.9  F (36.6  C) (Oral)   Resp 20   LMP 2021 (Approximate)   Breastfeeding Unknown   General: Bright affect, loving with baby. Family supportive.  Breasts: soft, nontender, nipples intact, bruising and cracking of left nipple.   Abdomen: soft, nontender, fundus below U.   Lochia: small amount, rubra, no clots or odor.   Perineum: well-approximated.   Extremities: no edema, Bakari's negative.     Postpartum hemoglobin   Hemoglobin   Date Value Ref Range Status   2022 11.6 (L) 11.7 - 15.7 g/dL Final      Prenatal Labs:   Lab Results   Component Value Date    AS Negative 01/10/2022     Rubella: Non immune    PROCEDURES:    2nd degree perineal lac repaired.    Current Discharge Medication List      START taking these medications    Details   acetaminophen (TYLENOL) 325 MG tablet Take 2 tablets (650 mg) by mouth every 4 hours as needed for mild pain or fever (greater than or equal to 38  C /100.4  F (oral) or 38.5  C/ 101.4  F (core).)  Refills: 0    Associated Diagnoses:  (normal spontaneous vaginal delivery)      docusate sodium (COLACE) 100 MG capsule  Take 1 capsule (100 mg) by mouth 2 times daily as needed for constipation  Qty: 60 capsule, Refills: 1    Associated Diagnoses:  (normal spontaneous vaginal delivery)      ibuprofen (ADVIL/MOTRIN) 800 MG tablet Take 1 tablet (800 mg) by mouth every 6 hours as needed for other (cramping)    Associated Diagnoses:  (normal spontaneous vaginal delivery)         CONTINUE these medications which have NOT CHANGED    Details   Prenatal Vit-Fe Fumarate-FA (PRENATAL MULTIVITAMIN  PLUS IRON) 27-1 MG TABS Take by mouth daily Thorn brand         STOP taking these medications       doxylamine (UNISOM) 25 MG TABS tablet Comments:   Reason for Stopping:                 CHOLO Hassan, LORA

## 2022-01-12 NOTE — PLAN OF CARE
VSS. Denies chest pain, SOB, nausea or any pre-e symptoms. Postpartum assessments WDL with fundus firm and midline at U/1. Some bruising/irritation at L nipple, lanolin given. Pt is up ad kj and using the bathroom appropriately. Breastfeeding with some assistance to get baby to deeply latch. Pt is wanting to see lactation before AM discharge. Reports pain at perineum and back, cold packs, tylenol and ibuprofen given. Pt wanting to discharge in the morning. Pt's SO, Kai, is present and supportive of patient and baby's cares. Continue per POC.     Note: both patient and SO use they/them pronouns. Parents have opted for she/they pronouns for baby.

## 2022-01-13 LAB — T PALLIDUM AB SER QL AGGL: NON REACTIVE

## 2022-02-16 NOTE — PLAN OF CARE
VSS. Up ad kj. Fundus firm at 1 cm below umbilicus. Lochia scant. Voids spont. Passing flatus, denies nausea. Cramping pain managed with Ibuprofen, refused Tylenol. Needs assistance with breast feeding. Continue plan of care.     Vitals:    01/10/22 2200 01/10/22 2245 01/11/22 0157 01/11/22 0622   BP: (!) 154/92 139/82 125/80 115/60   BP Location:    Left arm   Pulse: 98 100 89 71   Resp: 16  17 18   Temp: 98  F (36.7  C)  98.2  F (36.8  C) 97.8  F (36.6  C)   TempSrc: Oral  Oral Oral      [facility]

## 2022-03-01 ENCOUNTER — TRANSCRIBE ORDERS (OUTPATIENT)
Dept: OTHER | Age: 32
End: 2022-03-01
Payer: COMMERCIAL

## 2022-03-01 DIAGNOSIS — K62.89 RECTAL PAIN: ICD-10-CM

## 2022-03-01 DIAGNOSIS — R10.2 VAGINAL PAIN: Primary | ICD-10-CM

## 2022-04-03 ENCOUNTER — HEALTH MAINTENANCE LETTER (OUTPATIENT)
Age: 32
End: 2022-04-03

## 2022-04-05 ENCOUNTER — OFFICE VISIT (OUTPATIENT)
Dept: OBGYN | Facility: CLINIC | Age: 32
End: 2022-04-05
Attending: MIDWIFE
Payer: COMMERCIAL

## 2022-04-05 VITALS
DIASTOLIC BLOOD PRESSURE: 75 MMHG | BODY MASS INDEX: 29.95 KG/M2 | WEIGHT: 169 LBS | SYSTOLIC BLOOD PRESSURE: 106 MMHG | HEART RATE: 73 BPM | HEIGHT: 63 IN

## 2022-04-05 DIAGNOSIS — K62.89 RECTAL PAIN: ICD-10-CM

## 2022-04-05 DIAGNOSIS — R10.2 VAGINAL PAIN: Primary | ICD-10-CM

## 2022-04-05 PROCEDURE — 99203 OFFICE O/P NEW LOW 30 MIN: CPT | Mod: GE | Performed by: OBSTETRICS & GYNECOLOGY

## 2022-04-05 RX ORDER — THYROID,PORK 90 MG
TABLET ORAL
COMMUNITY
Start: 2021-06-21 | End: 2022-04-05

## 2022-04-05 RX ORDER — DOCUSATE SODIUM 100 MG/1
100 CAPSULE, LIQUID FILLED ORAL 2 TIMES DAILY PRN
Qty: 60 CAPSULE | Refills: 1 | Status: SHIPPED | OUTPATIENT
Start: 2022-04-05

## 2022-04-05 ASSESSMENT — PAIN SCALES - GENERAL: PAINLEVEL: NO PAIN (0)

## 2022-04-05 NOTE — NURSING NOTE
Chief Complaint   Patient presents with     Establish Care     C/O vaginal pain   Jannette Portillo LPN

## 2022-04-05 NOTE — PROGRESS NOTES
[facility]  Gynecology Visit    Reason for Visit: vaginal pain    HPI:    Anh Lucas is a 31 year old , here for vaginal and rectal pain. Patient is referred from [facility]. The patient is now about 3 months postpartum after an  and second degree laceration repair on 1/10/22 with the CNM service. Per the delivery note, a second degree perineal laceration was repaired in the standard fashion.     Since delivery, the patient has had vaginal pain. [facility] for postpartum appt who sent a referral for pelvic floor PT. Pelvic floor PT has been helping and pain has improved significantly over the past few weeks. Now, there is only pain with pressure or touch. [place] is somewhat painful, but this has also improved recently. The patient has also had some leaking of urine with sneezing or coughing that started in the third trimester and continued postpartum.  This has now improved to a very small amount of leaking about once per week now. No dysuria, hematuria. Normal BMs with colase. No abnormal vaginal discharge. No period yet, is exclusively breastfeeding. No abdominal pain or other concerns today.       OBHx  OB History    Para Term  AB Living   1 1 1 0 0 1   SAB IAB Ectopic Multiple Live Births   0 0 0 0 1      # Outcome Date GA Lbr Delfino/2nd Weight Sex Delivery Anes PTL Lv   1 Term 01/10/22 37w1d 01:10 / 00:05 2.8 kg (6 lb 2.8 oz) F Vag-Spont None N YASHIRA      Name: DEMIAN,FEMALE-ANH      Apgar1: 7  Apgar5: 8       Past Medical History:   Diagnosis Date     Fibromyalgia      Lyme disease        Past Surgical History:   Procedure Laterality Date     WISDOM TOOTH EXTRACTION           Current Outpatient Medications:      acetaminophen (TYLENOL) 325 MG tablet, Take 2 tablets (650 mg) by mouth every 4 hours as needed for mild pain or fever (greater than or equal to 38  C /100.4  F (oral) or 38.5  C/ 101.4  F (core).), Disp: , Rfl: 0     docusate sodium (COLACE) 100 MG  "capsule, Take 1 capsule (100 mg) by mouth 2 times daily as needed for constipation, Disp: 60 capsule, Rfl: 1     ibuprofen (ADVIL/MOTRIN) 800 MG tablet, Take 1 tablet (800 mg) by mouth every 6 hours as needed for other (cramping), Disp: , Rfl:      Prenatal Vit-Fe Fumarate-FA (PRENATAL MULTIVITAMIN  PLUS IRON) 27-1 MG TABS, Take by mouth daily Thorn brand, Disp: , Rfl:     Allergies   Allergen Reactions     Gluten Meal      hypersensitivity       No family history on file.    Social History     Socioeconomic History     Marital status: Single     Spouse name: Not on file     Number of children: Not on file     Years of education: Not on file     Highest education level: Not on file   Occupational History     Not on file   Tobacco Use     Smoking status: Never Smoker     Smokeless tobacco: Never Used   Substance and Sexual Activity     Alcohol use: Not Currently     Drug use: Not Currently     Types: Marijuana     Comment: Medical Cannabis Program since 2018     Sexual activity: Yes     Partners: Male   Other Topics Concern     Not on file   Social History Narrative     Not on file     Social Determinants of Health     Financial Resource Strain: Not on file   Food Insecurity: Not on file   Transportation Needs: Not on file   Physical Activity: Not on file   Stress: Not on file   Social Connections: Not on file   Intimate Partner Violence: Not on file   Housing Stability: Not on file       ROS: 10-Point ROS negative except as noted in HPI    Physical Exam  /75   Pulse 73   Ht 1.588 m (5' 2.5\")   Wt 76.7 kg (169 lb)   Breastfeeding Yes   BMI 30.42 kg/m    Gen: Well-appearing, NAD  HEENT: Normocephalic, atraumatic  CV:  Regular rate, well perfused  Pulm: breathing comfortably on room air  Abd: Soft, non-tender, non-distended  Ext: No LE edema, extremities warm and well perfused    Pelvic:  Normal appearing external female genitalia. Normal hair distribution. Site of perineal laceration is well healed. No " granulation tissue, dehiscence, masses/lumps. No erythema or drainage. Mild tenderness to palpation of this area. Normal anus, no hemorrhoids.       Assessment/Plan:  Tsering Lucas is a 31 year old  who is s/p  and second degree perineal lac repair about 3 months ago, who here for vaginal pain.     # vaginal pain  Pain has improved significantly with pelvic floor PT. Perineum is well healed with no signs of infection, granulation tissue, wound dehiscence, etc. Discussed continuing with pelvic floor PT. Also discussed tylenol or ibuprofen PRN for symptomatic relief. Offered topical lidocaine, but the patient declines at this time. Return to clinic if pain does not improve or worsens.     # constipation   - refill for prn colase sent to pharmacy    # routine health maintenance  - pt reports pap is up to date at outside clinic    Return to clinic as needed    Staffed with Dr. Anna Moore MD  OB/GYN PGY-2  2022 1:26 PM    The Patient was seen in [facility] by Dr. Moore.   I reviewed the history & exam. Assessment and plan were jointly made.   Sasha Castillo MD, MPH

## 2022-05-27 ENCOUNTER — E-VISIT (OUTPATIENT)
Dept: URGENT CARE | Facility: URGENT CARE | Age: 32
End: 2022-05-27
Payer: COMMERCIAL

## 2022-05-27 DIAGNOSIS — H10.023 PINK EYE DISEASE OF BOTH EYES: Primary | ICD-10-CM

## 2022-05-27 PROCEDURE — 99422 OL DIG E/M SVC 11-20 MIN: CPT | Performed by: PREVENTIVE MEDICINE

## 2022-05-27 NOTE — PATIENT INSTRUCTIONS
Thank you for choosing us for your care. I have placed an order for a prescription so that you can start treatment. View your full visit summary for details by clicking on the link below. Your pharmacist will able to address any questions you may have about the medication.     If you re not feeling better within 2-3 days, please schedule an appointment.  You can schedule an appointment right here in [facility], or call [phone]  If the visit is for the same symptoms as your eVisit, we ll refund the cost of your eVisit if seen within seven days.

## 2022-10-03 ENCOUNTER — HEALTH MAINTENANCE LETTER (OUTPATIENT)
Age: 32
End: 2022-10-03

## 2023-05-20 ENCOUNTER — HEALTH MAINTENANCE LETTER (OUTPATIENT)
Age: 33
End: 2023-05-20

## 2023-08-24 ENCOUNTER — HOSPITAL ENCOUNTER (EMERGENCY)
Facility: CLINIC | Age: 33
Discharge: HOME | End: 2023-08-24
Attending: INTERNAL MEDICINE | Admitting: INTERNAL MEDICINE
Payer: COMMERCIAL

## 2023-08-24 VITALS
RESPIRATION RATE: 16 BRPM | TEMPERATURE: 98.4 F | OXYGEN SATURATION: 99 % | HEART RATE: 107 BPM | BODY MASS INDEX: 26.79 KG/M2 | HEIGHT: 62 IN | SYSTOLIC BLOOD PRESSURE: 126 MMHG | DIASTOLIC BLOOD PRESSURE: 88 MMHG | WEIGHT: 145.6 LBS

## 2023-08-24 DIAGNOSIS — R11.2 NAUSEA VOMITING AND DIARRHEA: ICD-10-CM

## 2023-08-24 DIAGNOSIS — R19.7 NAUSEA VOMITING AND DIARRHEA: ICD-10-CM

## 2023-08-24 LAB
ALBUMIN SERPL BCG-MCNC: 4.3 G/DL (ref 3.5–5.2)
ALBUMIN UR-MCNC: NEGATIVE MG/DL
ALP SERPL-CCNC: 88 U/L (ref 35–129)
ALT SERPL W P-5'-P-CCNC: 21 U/L (ref 0–70)
ANION GAP SERPL CALCULATED.3IONS-SCNC: 13 MMOL/L (ref 7–15)
APPEARANCE UR: CLEAR
AST SERPL W P-5'-P-CCNC: 26 U/L (ref 0–45)
BACTERIA #/AREA URNS HPF: ABNORMAL /HPF
BASOPHILS # BLD AUTO: 0 10E3/UL (ref 0–0.2)
BASOPHILS NFR BLD AUTO: 0 %
BILIRUB SERPL-MCNC: 0.4 MG/DL
BILIRUB UR QL STRIP: NEGATIVE
BUN SERPL-MCNC: 9.4 MG/DL (ref 6–20)
CALCIUM SERPL-MCNC: 9.2 MG/DL (ref 8.6–10)
CHLORIDE SERPL-SCNC: 99 MMOL/L (ref 98–107)
COLOR UR AUTO: ABNORMAL
CREAT SERPL-MCNC: 0.75 MG/DL (ref 0.51–1.17)
CRP SERPL-MCNC: 22.93 MG/L
DEPRECATED HCO3 PLAS-SCNC: 21 MMOL/L (ref 22–29)
EOSINOPHIL # BLD AUTO: 0 10E3/UL (ref 0–0.7)
EOSINOPHIL NFR BLD AUTO: 0 %
ERYTHROCYTE [DISTWIDTH] IN BLOOD BY AUTOMATED COUNT: 12.1 % (ref 10–15)
GFR SERPL CREATININE-BSD FRML MDRD: >90 ML/MIN/1.73M2
GLUCOSE SERPL-MCNC: 89 MG/DL (ref 70–99)
GLUCOSE UR STRIP-MCNC: NEGATIVE MG/DL
HCG SERPL QL: NEGATIVE
HCT VFR BLD AUTO: 41.4 % (ref 35–53)
HGB BLD-MCNC: 14 G/DL (ref 11.7–17.7)
HGB UR QL STRIP: ABNORMAL
IMM GRANULOCYTES # BLD: 0 10E3/UL
IMM GRANULOCYTES NFR BLD: 0 %
INR PPP: 1.07 (ref 0.85–1.15)
KETONES UR STRIP-MCNC: 20 MG/DL
LACTATE SERPL-SCNC: 0.8 MMOL/L (ref 0.7–2)
LEUKOCYTE ESTERASE UR QL STRIP: NEGATIVE
LIPASE SERPL-CCNC: 17 U/L (ref 13–60)
LYMPHOCYTES # BLD AUTO: 0.8 10E3/UL (ref 0.8–5.3)
LYMPHOCYTES NFR BLD AUTO: 17 %
MCH RBC QN AUTO: 30.8 PG (ref 26.5–33)
MCHC RBC AUTO-ENTMCNC: 33.8 G/DL (ref 31.5–36.5)
MCV RBC AUTO: 91 FL (ref 78–100)
MONOCYTES # BLD AUTO: 0.5 10E3/UL (ref 0–1.3)
MONOCYTES NFR BLD AUTO: 10 %
NEUTROPHILS # BLD AUTO: 3.5 10E3/UL (ref 1.6–8.3)
NEUTROPHILS NFR BLD AUTO: 73 %
NITRATE UR QL: NEGATIVE
NRBC # BLD AUTO: 0 10E3/UL
NRBC BLD AUTO-RTO: 0 /100
PH UR STRIP: 6 [PH] (ref 5–7)
PLATELET # BLD AUTO: 254 10E3/UL (ref 150–450)
POTASSIUM SERPL-SCNC: 3.3 MMOL/L (ref 3.4–5.3)
PROT SERPL-MCNC: 7.7 G/DL (ref 6.4–8.3)
RBC # BLD AUTO: 4.54 10E6/UL (ref 3.8–5.9)
RBC URINE: 1 /HPF
SODIUM SERPL-SCNC: 133 MMOL/L (ref 136–145)
SP GR UR STRIP: 1.01 (ref 1–1.03)
SQUAMOUS EPITHELIAL: 2 /HPF
UROBILINOGEN UR STRIP-MCNC: NORMAL MG/DL
WBC # BLD AUTO: 4.9 10E3/UL (ref 4–11)
WBC URINE: 1 /HPF

## 2023-08-24 PROCEDURE — 250N000011 HC RX IP 250 OP 636: Mod: JZ | Performed by: INTERNAL MEDICINE

## 2023-08-24 PROCEDURE — 80053 COMPREHEN METABOLIC PANEL: CPT | Performed by: INTERNAL MEDICINE

## 2023-08-24 PROCEDURE — 83690 ASSAY OF LIPASE: CPT | Performed by: INTERNAL MEDICINE

## 2023-08-24 PROCEDURE — 87507 IADNA-DNA/RNA PROBE TQ 12-25: CPT | Performed by: INTERNAL MEDICINE

## 2023-08-24 PROCEDURE — 81001 URINALYSIS AUTO W/SCOPE: CPT | Performed by: INTERNAL MEDICINE

## 2023-08-24 PROCEDURE — 96374 THER/PROPH/DIAG INJ IV PUSH: CPT | Performed by: INTERNAL MEDICINE

## 2023-08-24 PROCEDURE — 84703 CHORIONIC GONADOTROPIN ASSAY: CPT | Performed by: INTERNAL MEDICINE

## 2023-08-24 PROCEDURE — 86140 C-REACTIVE PROTEIN: CPT | Performed by: INTERNAL MEDICINE

## 2023-08-24 PROCEDURE — 83605 ASSAY OF LACTIC ACID: CPT | Performed by: INTERNAL MEDICINE

## 2023-08-24 PROCEDURE — 96361 HYDRATE IV INFUSION ADD-ON: CPT | Performed by: INTERNAL MEDICINE

## 2023-08-24 PROCEDURE — 85610 PROTHROMBIN TIME: CPT | Performed by: INTERNAL MEDICINE

## 2023-08-24 PROCEDURE — 258N000003 HC RX IP 258 OP 636: Performed by: INTERNAL MEDICINE

## 2023-08-24 PROCEDURE — 99284 EMERGENCY DEPT VISIT MOD MDM: CPT | Performed by: INTERNAL MEDICINE

## 2023-08-24 PROCEDURE — 36415 COLL VENOUS BLD VENIPUNCTURE: CPT | Performed by: INTERNAL MEDICINE

## 2023-08-24 PROCEDURE — 99284 EMERGENCY DEPT VISIT MOD MDM: CPT | Mod: 25 | Performed by: INTERNAL MEDICINE

## 2023-08-24 PROCEDURE — 85025 COMPLETE CBC W/AUTO DIFF WBC: CPT | Performed by: INTERNAL MEDICINE

## 2023-08-24 PROCEDURE — 96360 HYDRATION IV INFUSION INIT: CPT | Performed by: INTERNAL MEDICINE

## 2023-08-24 RX ORDER — ONDANSETRON 4 MG/1
4 TABLET, ORALLY DISINTEGRATING ORAL EVERY 8 HOURS PRN
Qty: 10 TABLET | Refills: 0 | Status: SHIPPED | OUTPATIENT
Start: 2023-08-24 | End: 2023-08-27

## 2023-08-24 RX ORDER — ONDANSETRON 2 MG/ML
4 INJECTION INTRAMUSCULAR; INTRAVENOUS ONCE
Status: COMPLETED | OUTPATIENT
Start: 2023-08-24 | End: 2023-08-24

## 2023-08-24 RX ORDER — LOPERAMIDE HYDROCHLORIDE 2 MG/1
2 TABLET ORAL 4 TIMES DAILY PRN
Qty: 24 TABLET | Refills: 0 | Status: SHIPPED | OUTPATIENT
Start: 2023-08-24

## 2023-08-24 RX ADMIN — ONDANSETRON 4 MG: 2 INJECTION INTRAMUSCULAR; INTRAVENOUS at 15:46

## 2023-08-24 RX ADMIN — SODIUM CHLORIDE 1000 ML: 9 INJECTION, SOLUTION INTRAVENOUS at 15:44

## 2023-08-24 ASSESSMENT — ENCOUNTER SYMPTOMS
WEAKNESS: 0
LIGHT-HEADEDNESS: 1
ABDOMINAL PAIN: 1
TROUBLE SWALLOWING: 0
DIFFICULTY URINATING: 0
CHILLS: 0
VOMITING: 1
ADENOPATHY: 0
FEVER: 0
DIARRHEA: 1
COUGH: 0
NAUSEA: 1
NUMBNESS: 0
APPETITE CHANGE: 1
CONFUSION: 0
SHORTNESS OF BREATH: 0

## 2023-08-24 ASSESSMENT — ACTIVITIES OF DAILY LIVING (ADL)
ADLS_ACUITY_SCORE: 33
ADLS_ACUITY_SCORE: 35

## 2023-08-24 NOTE — ED PROVIDER NOTES
ED Provider Note  [facility]      History     Chief Complaint   Patient presents with    Diarrhea     Patient presents due to nausea, congestion and diarrhea; present for 3 days. Patient also reports cramping generalized intermittent 3/10 abdominal pain. Patient reports feeling dehydrated and light-headed.        Diarrhea  Associated symptoms: abdominal pain and vomiting    Associated symptoms: no chills and no fever      Tsering Lucas is a 33 year old adult who presents with 1 day of nausea, vomiting abdominal cramping and diarrhea. Her partner had some diarrhea earlier int he week after returning from [place]. Her infant daughter had some diarrhea earlier in the week as well. She and the daughter were not in [place] and have had no unusual foods. She feels lightheaded. She has no fever or chills. She has some nasal congestion. She has no cough, sputum or shortness of breath. She is currently breast feeding. She does have exposure to chickens.    Past Medical History:   Diagnosis Date    Fibromyalgia     Lyme disease        Past Surgical History:   Procedure Laterality Date    WISDOM TOOTH EXTRACTION         History reviewed. No pertinent family history.    Social History     Tobacco Use    Smoking status: Never    Smokeless tobacco: Never   Substance Use Topics    Alcohol use: Yes     Comment: occasional         Review of Systems   Constitutional:  Positive for appetite change. Negative for chills and fever.   HENT:  Positive for congestion. Negative for trouble swallowing.    Eyes:  Negative for visual disturbance.   Respiratory:  Negative for cough and shortness of breath.    Cardiovascular:  Negative for chest pain and leg swelling.   Gastrointestinal:  Positive for abdominal pain, diarrhea, nausea and vomiting.   Genitourinary:  Negative for difficulty urinating.   Skin:  Negative for rash.   Neurological:  Positive for light-headedness. Negative for weakness and numbness.  "  Hematological:  Negative for adenopathy.   Psychiatric/Behavioral:  Negative for confusion.        Physical Exam   BP: 110/79  Pulse: 107  Temp: 98.4  F (36.9  C)  Resp: 16  Height: 157.5 cm (5' 2\")  Weight: 66 kg (145 lb 9.6 oz)  SpO2: 99 %  Physical Exam  Vitals and nursing note reviewed.   Constitutional:       General: Abbe Lucas is not in acute distress.     Appearance: Normal appearance.   HENT:      Head: Normocephalic and atraumatic.      Right Ear: External ear normal.      Left Ear: External ear normal.      Nose: Nose normal.      Mouth/Throat:      Mouth: Mucous membranes are moist.   Eyes:      General: No scleral icterus.     Extraocular Movements: Extraocular movements intact.      Pupils: Pupils are equal, round, and reactive to light.   Cardiovascular:      Rate and Rhythm: Normal rate and regular rhythm.      Heart sounds: No murmur heard.  Pulmonary:      Effort: Pulmonary effort is normal.      Breath sounds: Normal breath sounds. No wheezing or rales.   Abdominal:      Tenderness: There is abdominal tenderness (minimal diffuse).   Musculoskeletal:      Cervical back: Normal range of motion and neck supple.      Right lower leg: No edema.      Left lower leg: No edema.   Skin:     General: Skin is warm and dry.   Neurological:      General: No focal deficit present.      Mental Status: Abbe Lucas is alert and oriented to person, place, and time.      Cranial Nerves: No cranial nerve deficit.      Motor: No weakness.   Psychiatric:         Mood and Affect: Mood normal.         Behavior: Behavior normal.           ED Course, Procedures, & Data      Procedures              Labs/Imaging    Results for orders placed or performed during the hospital encounter of 08/24/23 (from the past 24 hour(s))   CBC with platelets differential    Narrative    The following orders were created for panel order CBC with platelets differential.  Procedure                               " "Abnormality         Status                     ---------                               -----------         ------                     CBC with platelets and d...[[id number]]                      Final result                 Please view results for these tests on the individual orders.   INR   Result Value Ref Range    INR 1.07 0.85 - 1.15   Comprehensive metabolic panel   Result Value Ref Range    Sodium 133 (L) 136 - 145 mmol/L    Potassium 3.3 (L) 3.4 - 5.3 mmol/L    Chloride 99 98 - 107 mmol/L    Carbon Dioxide (CO2) 21 (L) 22 - 29 mmol/L    Anion Gap 13 7 - 15 mmol/L    Urea Nitrogen 9.4 6.0 - 20.0 mg/dL    Creatinine 0.75 0.51 - 1.17 mg/dL    Calcium 9.2 8.6 - 10.0 mg/dL    Glucose 89 70 - 99 mg/dL    Alkaline Phosphatase 88 35 - 129 U/L    AST 26 0 - 45 U/L    ALT 21 0 - 70 U/L    Protein Total 7.7 6.4 - 8.3 g/dL    Albumin 4.3 3.5 - 5.2 g/dL    Bilirubin Total 0.4 <=1.2 mg/dL    GFR Estimate >90 >60 mL/min/1.73m2    Narrative    The generation of reference intervals for this test is currently based on binary male or female sex. If the electronic health record information indicates another gender identity or if Legal Sex is recorded as \"Unknown\", both male and female reference intervals are provided where applicable, and should be considered according to the individual's appropriate clinical context.   Lipase   Result Value Ref Range    Lipase 17 13 - 60 U/L   Lactic acid whole blood   Result Value Ref Range    Lactic Acid 0.8 0.7 - 2.0 mmol/L   CRP inflammation   Result Value Ref Range    CRP Inflammation 22.93 (H) <5.00 mg/L   HCG qualitative Blood   Result Value Ref Range    hCG Serum Qualitative Negative Negative   CBC with platelets and differential   Result Value Ref Range    WBC Count 4.9 4.0 - 11.0 10e3/uL    RBC Count 4.54 3.80 - 5.90 10e6/uL    Hemoglobin 14.0 11.7 - 17.7 g/dL    Hematocrit 41.4 35.0 - 53.0 %    MCV 91 78 - 100 fL    MCH 30.8 26.5 - 33.0 pg    MCHC 33.8 31.5 - 36.5 g/dL    RDW 12.1 " "10.0 - 15.0 %    Platelet Count 254 150 - 450 10e3/uL    % Neutrophils 73 %    % Lymphocytes 17 %    % Monocytes 10 %    % Eosinophils 0 %    % Basophils 0 %    % Immature Granulocytes 0 %    NRBCs per 100 WBC 0 <1 /100    Absolute Neutrophils 3.5 1.6 - 8.3 10e3/uL    Absolute Lymphocytes 0.8 0.8 - 5.3 10e3/uL    Absolute Monocytes 0.5 0.0 - 1.3 10e3/uL    Absolute Eosinophils 0.0 0.0 - 0.7 10e3/uL    Absolute Basophils 0.0 0.0 - 0.2 10e3/uL    Absolute Immature Granulocytes 0.0 <=0.4 10e3/uL    Absolute NRBCs 0.0 10e3/uL    Narrative    The generation of reference intervals for this test is currently based on binary male or female sex. If the electronic health record information indicates another gender identity or if Legal Sex is recorded as \"Unknown\", both male and female reference intervals are provided where applicable, and should be considered according to the individual's appropriate clinical context.   UA with Microscopic reflex to Culture    Specimen: Urine, Clean Catch   Result Value Ref Range    Color Urine Straw Colorless, Straw, Light Yellow, Yellow    Appearance Urine Clear Clear    Glucose Urine Negative Negative mg/dL    Bilirubin Urine Negative Negative    Ketones Urine 20 (A) Negative mg/dL    Specific Gravity Urine 1.006 1.003 - 1.035    Blood Urine Trace (A) Negative    pH Urine 6.0 5.0 - 7.0    Protein Albumin Urine Negative Negative mg/dL    Urobilinogen Urine Normal Normal, 2.0 mg/dL    Nitrite Urine Negative Negative    Leukocyte Esterase Urine Negative Negative    Bacteria Urine Moderate (A) None Seen /HPF    RBC Urine 1 <=2 /HPF    WBC Urine 1 <=5 /HPF    Squamous Epithelials Urine 2 (H) <=1 /HPF    Narrative    Urine Culture not indicated              Medications   0.9% sodium chloride BOLUS (0 mLs Intravenous Stopped 8/24/23 0525)   ondansetron (ZOFRAN) injection 4 mg (4 mg Intravenous $Given 8/24/23 1546)     No orders to display          Critical care was not performed.     Medical " Decision Making  The patient's presentation was of moderate complexity (an undiagnosed new problem with uncertain diagnosis).    The patient's evaluation involved:  ordering and/or review of 3+ test(s) in this encounter (see separate area of note for details)    The patient's management necessitated moderate risk (prescription drug management including medications given in the ED).    Assessment & Plan    Impression:  Young woman presents with 1 day of nausea, vomiting and diarrhea. Her child and partner both had diarrhea earlier int he week. The partner had recently traveled to [place]. She and her daughter also have exposure to live chickens. She has mild hypokalemia.and hyponatremia. CRP is elevated at 23. CBC is normal. LFTs, lipase and lactate are normal. UA is unremarkable. She was treated with IV fluids and zofran for nausea. She is tolerating oral fluids. Chanell PCR for enteric pathogens is pending. At this point, she can be discharged to home. She will be given prescription for Zofran as needed for nausea. She should advance diet as tolerated but avoid dairy products. She is given prescription for loperamide, but was asked to wait for stool results before beginning the loperamide. (Her child is still breast feeding but is a young toddler so loperamide should not pose a significant risk with breast feeding given the child's size and age). She should return for fever, worsening diarrhea or abdominal pain.    I have reviewed the nursing notes. I have reviewed the findings, diagnosis, plan and need for follow up with the patient.    New Prescriptions    LOPERAMIDE (IMODIUM A-D) 2 MG TABLET    Take 1 tablet (2 mg) by mouth 4 times daily as needed for diarrhea    ONDANSETRON (ZOFRAN ODT) 4 MG ODT TAB    Take 1 tablet (4 mg) by mouth every 8 hours as needed for nausea or vomiting       Final diagnoses:   Nausea vomiting and diarrhea       Senthil Queen  [facility] EMERGENCY DEPARTMENT  8/24/2023      Senthil Queen MD  08/24/23 [id number]

## 2023-08-24 NOTE — DISCHARGE INSTRUCTIONS
Clear liquid diet. Add soft foods and solids as tolerated.  Imodium 1 tablet after each loose stool up to 4/ day. Please wait for the stool culture results tomorrow before starting, (There are some causes of the diarrhea that can be made worse with Imodium.  Return for worsening vomiting, abdominal pain, or high fever.

## 2023-08-24 NOTE — ED TRIAGE NOTES
Patient presents due to nausea, congestion and diarrhea; present for 3 days. Patient also reports cramping generalized intermittent 3/10 abdominal pain. Patient reports feeling dehydrated and light-headed.      Triage Assessment       Row Name 08/24/23 5305       Triage Assessment (Adult)    Airway WDL WDL       Respiratory WDL    Respiratory WDL WDL       Skin Circulation/Temperature WDL    Skin Circulation/Temperature WDL WDL       Cardiac WDL    Cardiac WDL WDL       Peripheral/Neurovascular WDL    Peripheral Neurovascular WDL WDL       Cognitive/Neuro/Behavioral WDL    Cognitive/Neuro/Behavioral WDL WDL

## 2023-08-25 LAB

## 2024-07-27 ENCOUNTER — HEALTH MAINTENANCE LETTER (OUTPATIENT)
Age: 34
End: 2024-07-27

## 2025-08-10 ENCOUNTER — HEALTH MAINTENANCE LETTER (OUTPATIENT)
Age: 35
End: 2025-08-10